# Patient Record
Sex: FEMALE | Race: WHITE | URBAN - METROPOLITAN AREA
[De-identification: names, ages, dates, MRNs, and addresses within clinical notes are randomized per-mention and may not be internally consistent; named-entity substitution may affect disease eponyms.]

---

## 2017-01-01 ENCOUNTER — TELEPHONE (OUTPATIENT)
Dept: PULMONOLOGY | Facility: CLINIC | Age: 67
End: 2017-01-01

## 2017-01-01 ENCOUNTER — TELEPHONE (OUTPATIENT)
Dept: TRANSPLANT | Facility: CLINIC | Age: 67
End: 2017-01-01

## 2017-01-01 ENCOUNTER — TRANSFERRED RECORDS (OUTPATIENT)
Dept: HEALTH INFORMATION MANAGEMENT | Facility: CLINIC | Age: 67
End: 2017-01-01

## 2017-01-01 ENCOUNTER — RESULTS ONLY (OUTPATIENT)
Dept: OTHER | Facility: CLINIC | Age: 67
End: 2017-01-01

## 2017-01-01 ENCOUNTER — OFFICE VISIT (OUTPATIENT)
Dept: PULMONOLOGY | Facility: CLINIC | Age: 67
End: 2017-01-01
Attending: INTERNAL MEDICINE
Payer: MEDICARE

## 2017-01-01 ENCOUNTER — HOSPITAL ENCOUNTER (OUTPATIENT)
Dept: CARDIOLOGY | Facility: CLINIC | Age: 67
Discharge: HOME | End: 2017-05-15
Attending: INTERNAL MEDICINE | Admitting: INTERNAL MEDICINE
Payer: MEDICARE

## 2017-01-01 VITALS
DIASTOLIC BLOOD PRESSURE: 85 MMHG | BODY MASS INDEX: 32.43 KG/M2 | HEART RATE: 68 BPM | RESPIRATION RATE: 18 BRPM | TEMPERATURE: 97.3 F | WEIGHT: 186 LBS | OXYGEN SATURATION: 94 % | SYSTOLIC BLOOD PRESSURE: 138 MMHG

## 2017-01-01 VITALS
HEART RATE: 78 BPM | DIASTOLIC BLOOD PRESSURE: 84 MMHG | HEIGHT: 64 IN | WEIGHT: 198.5 LBS | BODY MASS INDEX: 33.89 KG/M2 | OXYGEN SATURATION: 95 % | RESPIRATION RATE: 18 BRPM | TEMPERATURE: 97.9 F | SYSTOLIC BLOOD PRESSURE: 130 MMHG

## 2017-01-01 DIAGNOSIS — E03.9 HYPOTHYROIDISM, UNSPECIFIED TYPE: ICD-10-CM

## 2017-01-01 DIAGNOSIS — Z94.2 LUNG TRANSPLANT STATUS, BILATERAL (H): ICD-10-CM

## 2017-01-01 DIAGNOSIS — Z94.2 LUNG REPLACED BY TRANSPLANT (H): ICD-10-CM

## 2017-01-01 DIAGNOSIS — N18.30 ANEMIA IN STAGE 3 CHRONIC KIDNEY DISEASE (H): ICD-10-CM

## 2017-01-01 DIAGNOSIS — Z94.2 LUNG REPLACED BY TRANSPLANT (H): Primary | ICD-10-CM

## 2017-01-01 DIAGNOSIS — Z79.899 ENCOUNTER FOR LONG-TERM (CURRENT) USE OF MEDICATIONS: ICD-10-CM

## 2017-01-01 DIAGNOSIS — G47.00 INSOMNIA: ICD-10-CM

## 2017-01-01 DIAGNOSIS — R06.02 SOB (SHORTNESS OF BREATH): ICD-10-CM

## 2017-01-01 DIAGNOSIS — Z79.899 ENCOUNTER FOR LONG-TERM (CURRENT) USE OF HIGH-RISK MEDICATION: ICD-10-CM

## 2017-01-01 DIAGNOSIS — N18.30 CKD (CHRONIC KIDNEY DISEASE) STAGE 3, GFR 30-59 ML/MIN (H): ICD-10-CM

## 2017-01-01 DIAGNOSIS — Z29.9 PROPHYLACTIC MEASURE: ICD-10-CM

## 2017-01-01 DIAGNOSIS — Z94.2 LUNG TRANSPLANT STATUS, BILATERAL (H): Primary | ICD-10-CM

## 2017-01-01 DIAGNOSIS — Z94.2 S/P LUNG TRANSPLANT (H): Primary | ICD-10-CM

## 2017-01-01 DIAGNOSIS — N18.30 CHRONIC KIDNEY DISEASE, STAGE III (MODERATE) (H): ICD-10-CM

## 2017-01-01 DIAGNOSIS — G47.00 INSOMNIA, UNSPECIFIED: Primary | ICD-10-CM

## 2017-01-01 DIAGNOSIS — D63.1 ANEMIA IN STAGE 3 CHRONIC KIDNEY DISEASE (H): ICD-10-CM

## 2017-01-01 DIAGNOSIS — F32.A DEPRESSION: Primary | ICD-10-CM

## 2017-01-01 DIAGNOSIS — Z79.52 LONG TERM CURRENT USE OF SYSTEMIC STEROIDS: ICD-10-CM

## 2017-01-01 DIAGNOSIS — K21.9 GERD (GASTROESOPHAGEAL REFLUX DISEASE): Primary | ICD-10-CM

## 2017-01-01 DIAGNOSIS — R91.8 LUNG MASS: ICD-10-CM

## 2017-01-01 LAB
ANION GAP SERPL CALCULATED.3IONS-SCNC: 11 MMOL/L (ref 3–14)
ANION GAP SERPL CALCULATED.3IONS-SCNC: 12 MMOL/L (ref 3–14)
BASOPHILS # BLD AUTO: 0 10E9/L (ref 0–0.2)
BASOPHILS NFR BLD AUTO: 0 %
BUN SERPL-MCNC: 85 MG/DL (ref 7–30)
BUN SERPL-MCNC: 86 MG/DL (ref 7–30)
CALCIUM SERPL-MCNC: 8.6 MG/DL (ref 8.5–10.1)
CALCIUM SERPL-MCNC: 8.7 MG/DL (ref 8.5–10.1)
CHLORIDE SERPL-SCNC: 104 MMOL/L (ref 94–109)
CHLORIDE SERPL-SCNC: 108 MMOL/L (ref 94–109)
CMV DNA SPEC NAA+PROBE-ACNC: NORMAL [IU]/ML
CMV DNA SPEC NAA+PROBE-ACNC: NORMAL [IU]/ML
CMV DNA SPEC NAA+PROBE-LOG#: NORMAL {LOG_IU}/ML
CMV DNA SPEC NAA+PROBE-LOG#: NORMAL {LOG_IU}/ML
CO2 SERPL-SCNC: 20 MMOL/L (ref 20–32)
CO2 SERPL-SCNC: 27 MMOL/L (ref 20–32)
COPATH REPORT: NORMAL
CREAT SERPL-MCNC: 2.05 MG/DL (ref 0.52–1.04)
CREAT SERPL-MCNC: 2.49 MG/DL (ref 0.52–1.04)
DIFFERENTIAL METHOD BLD: ABNORMAL
DONOR IDENTIFICATION: NORMAL
DSA COMMENTS: NORMAL
DSA PRESENT: NO
DSA TEST METHOD: NORMAL
EOSINOPHIL # BLD AUTO: 0 10E9/L (ref 0–0.7)
EOSINOPHIL NFR BLD AUTO: 0.3 %
ERYTHROCYTE [DISTWIDTH] IN BLOOD BY AUTOMATED COUNT: 13.9 % (ref 10–15)
ERYTHROCYTE [DISTWIDTH] IN BLOOD BY AUTOMATED COUNT: 14.4 % (ref 10–15)
ERYTHROCYTE [DISTWIDTH] IN BLOOD BY AUTOMATED COUNT: 14.9 % (ref 10–15)
EXPTIME-PRE: 5.94 SEC
EXPTIME-PRE: 7.32 SEC
FEF2575-%PRED-PRE: 45 %
FEF2575-%PRED-PRE: 47 %
FEF2575-PRE: 0.92 L/SEC
FEF2575-PRE: 0.96 L/SEC
FEF2575-PRED: 2.01 L/SEC
FEF2575-PRED: 2.02 L/SEC
FEFMAX-%PRED-PRE: 19 %
FEFMAX-%PRED-PRE: 30 %
FEFMAX-PRE: 1.17 L/SEC
FEFMAX-PRE: 1.78 L/SEC
FEFMAX-PRED: 5.87 L/SEC
FEFMAX-PRED: 5.88 L/SEC
FERRITIN SERPL-MCNC: 1255 NG/ML (ref 8–252)
FERRITIN SERPL-MCNC: 523 NG/ML (ref 8–252)
FEV1-%PRED-PRE: 36 %
FEV1-%PRED-PRE: 44 %
FEV1-PRE: 0.84 L
FEV1-PRE: 1.02 L
FEV1FEV6-PRE: 79 %
FEV1FEV6-PRE: 87 %
FEV1FEV6-PRED: 80 %
FEV1FEV6-PRED: 80 %
FEV1FVC-PRE: 77 %
FEV1FVC-PRE: 87 %
FEV1FVC-PRED: 79 %
FEV1FVC-PRED: 79 %
FIFMAX-PRE: 0.41 L/SEC
FIFMAX-PRE: 0.84 L/SEC
FVC-%PRED-PRE: 33 %
FVC-%PRED-PRE: 45 %
FVC-PRE: 0.97 L
FVC-PRE: 1.33 L
FVC-PRED: 2.93 L
FVC-PRED: 2.94 L
GFR SERPL CREATININE-BSD FRML MDRD: 19 ML/MIN/1.7M2
GFR SERPL CREATININE-BSD FRML MDRD: 24 ML/MIN/1.7M2
GLUCOSE SERPL-MCNC: 190 MG/DL (ref 70–99)
GLUCOSE SERPL-MCNC: 93 MG/DL (ref 70–99)
HCT VFR BLD AUTO: 28.5 % (ref 35–47)
HCT VFR BLD AUTO: 28.8 % (ref 35–47)
HCT VFR BLD AUTO: 32.4 % (ref 35–47)
HGB BLD-MCNC: 10.2 G/DL (ref 11.7–15.7)
HGB BLD-MCNC: 9 G/DL (ref 11.7–15.7)
HGB BLD-MCNC: 9.2 G/DL (ref 11.7–15.7)
IMM GRANULOCYTES # BLD: 0.1 10E9/L (ref 0–0.4)
IMM GRANULOCYTES NFR BLD: 1.7 %
INR PPP: 1 (ref 0.86–1.14)
IRON SATN MFR SERPL: 33 % (ref 15–46)
IRON SATN MFR SERPL: 45 % (ref 15–46)
IRON SERPL-MCNC: 124 UG/DL (ref 35–180)
IRON SERPL-MCNC: 82 UG/DL (ref 35–180)
LYMPHOCYTES # BLD AUTO: 0.8 10E9/L (ref 0.8–5.3)
LYMPHOCYTES NFR BLD AUTO: 13.8 %
MAGNESIUM SERPL-MCNC: 2.1 MG/DL (ref 1.6–2.3)
MAGNESIUM SERPL-MCNC: 2.2 MG/DL (ref 1.6–2.3)
MCH RBC QN AUTO: 30.1 PG (ref 26.5–33)
MCH RBC QN AUTO: 31.4 PG (ref 26.5–33)
MCH RBC QN AUTO: 32.4 PG (ref 26.5–33)
MCHC RBC AUTO-ENTMCNC: 31.3 G/DL (ref 31.5–36.5)
MCHC RBC AUTO-ENTMCNC: 31.5 G/DL (ref 31.5–36.5)
MCHC RBC AUTO-ENTMCNC: 32.3 G/DL (ref 31.5–36.5)
MCV RBC AUTO: 103 FL (ref 78–100)
MCV RBC AUTO: 96 FL (ref 78–100)
MCV RBC AUTO: 97 FL (ref 78–100)
MONOCYTES # BLD AUTO: 0.4 10E9/L (ref 0–1.3)
MONOCYTES NFR BLD AUTO: 6.3 %
NEUTROPHILS # BLD AUTO: 4.6 10E9/L (ref 1.6–8.3)
NEUTROPHILS NFR BLD AUTO: 77.9 %
NRBC # BLD AUTO: 0 10*3/UL
NRBC BLD AUTO-RTO: 0 /100
ORGAN: NORMAL
PLATELET # BLD AUTO: 150 10E9/L (ref 150–450)
PLATELET # BLD AUTO: 73 10E9/L (ref 150–450)
PLATELET # BLD AUTO: 73 10E9/L (ref 150–450)
POTASSIUM SERPL-SCNC: 4.3 MMOL/L (ref 3.4–5.3)
POTASSIUM SERPL-SCNC: 4.6 MMOL/L (ref 3.4–5.3)
PRA DONOR SPECIFIC ABY: NORMAL
RBC # BLD AUTO: 2.93 10E12/L (ref 3.8–5.2)
RBC # BLD AUTO: 2.99 10E12/L (ref 3.8–5.2)
RBC # BLD AUTO: 3.15 10E12/L (ref 3.8–5.2)
RETICS # AUTO: 45.9 10E9/L (ref 25–95)
RETICS/RBC NFR AUTO: 1.5 % (ref 0.5–2)
SA1 CELL: NORMAL
SA1 COMMENTS: NORMAL
SA1 HI RISK ABY: NORMAL
SA1 MOD RISK ABY: NORMAL
SA1 TEST METHOD: NORMAL
SA2 CELL: NORMAL
SA2 COMMENTS: NORMAL
SA2 HI RISK ABY UA: NORMAL
SA2 MOD RISK ABY: NORMAL
SA2 TEST METHOD: NORMAL
SIROLIMUS BLD-MCNC: 3 UG/L (ref 5–15)
SIROLIMUS BLD-MCNC: 3.4 UG/L (ref 5–15)
SIROLIMUS BLD-MCNC: ABNORMAL UG/L (ref 5–15)
SODIUM SERPL-SCNC: 140 MMOL/L (ref 133–144)
SODIUM SERPL-SCNC: 142 MMOL/L (ref 133–144)
SPECIMEN SOURCE: NORMAL
SPECIMEN SOURCE: NORMAL
TACROLIMUS BLD-MCNC: 11.9 UG/L (ref 5–15)
TACROLIMUS BLD-MCNC: 5.4 UG/L (ref 5–15)
TACROLIMUS BLD-MCNC: 7.2 UG/L (ref 5–15)
TACROLIMUS BLD-MCNC: 7.2 UG/L (ref 5–15)
TACROLIMUS BLD-MCNC: 7.6 UG/L (ref 5–15)
TACROLIMUS BLD-MCNC: 7.8 UG/L (ref 5–15)
TACROLIMUS BLD-MCNC: 8.4 UG/L (ref 5–15)
TACROLIMUS BLD-MCNC: 8.4 UG/L (ref 5–15)
TACROLIMUS BLD-MCNC: 8.5 UG/L (ref 5–15)
TACROLIMUS BLD-MCNC: 8.7 UG/L (ref 5–15)
TACROLIMUS BLD-MCNC: 9.6 UG/L (ref 5–15)
TIBC SERPL-MCNC: 247 UG/DL (ref 240–430)
TIBC SERPL-MCNC: 277 UG/DL (ref 240–430)
TME LAST DOSE: ABNORMAL H
TME LAST DOSE: NORMAL H
UNOS CPRA: 82
WBC # BLD AUTO: 3.5 10E9/L (ref 4–11)
WBC # BLD AUTO: 5 10E9/L (ref 4–11)
WBC # BLD AUTO: 5.9 10E9/L (ref 4–11)

## 2017-01-01 PROCEDURE — 80197 ASSAY OF TACROLIMUS: CPT | Performed by: INTERNAL MEDICINE

## 2017-01-01 PROCEDURE — 83735 ASSAY OF MAGNESIUM: CPT | Performed by: INTERNAL MEDICINE

## 2017-01-01 PROCEDURE — 80195 ASSAY OF SIROLIMUS: CPT | Performed by: INTERNAL MEDICINE

## 2017-01-01 PROCEDURE — 80048 BASIC METABOLIC PNL TOTAL CA: CPT | Performed by: INTERNAL MEDICINE

## 2017-01-01 PROCEDURE — 83540 ASSAY OF IRON: CPT | Performed by: INTERNAL MEDICINE

## 2017-01-01 PROCEDURE — 85025 COMPLETE CBC W/AUTO DIFF WBC: CPT | Performed by: INTERNAL MEDICINE

## 2017-01-01 PROCEDURE — 85610 PROTHROMBIN TIME: CPT | Performed by: INTERNAL MEDICINE

## 2017-01-01 PROCEDURE — 36415 COLL VENOUS BLD VENIPUNCTURE: CPT | Performed by: INTERNAL MEDICINE

## 2017-01-01 PROCEDURE — 40000611 ZZHCL STATISTIC MORPHOLOGY W/INTERP HEMEPATH TC 85060: Performed by: INTERNAL MEDICINE

## 2017-01-01 PROCEDURE — 99212 OFFICE O/P EST SF 10 MIN: CPT | Mod: ZF

## 2017-01-01 PROCEDURE — 85045 AUTOMATED RETICULOCYTE COUNT: CPT | Performed by: INTERNAL MEDICINE

## 2017-01-01 PROCEDURE — 93306 TTE W/DOPPLER COMPLETE: CPT | Mod: 26 | Performed by: INTERNAL MEDICINE

## 2017-01-01 PROCEDURE — 86832 HLA CLASS I HIGH DEFIN QUAL: CPT | Performed by: INTERNAL MEDICINE

## 2017-01-01 PROCEDURE — 86833 HLA CLASS II HIGH DEFIN QUAL: CPT | Performed by: INTERNAL MEDICINE

## 2017-01-01 PROCEDURE — 83550 IRON BINDING TEST: CPT | Performed by: INTERNAL MEDICINE

## 2017-01-01 PROCEDURE — 99212 OFFICE O/P EST SF 10 MIN: CPT

## 2017-01-01 PROCEDURE — 25500064 ZZH RX 255 OP 636: Performed by: INTERNAL MEDICINE

## 2017-01-01 PROCEDURE — 85027 COMPLETE CBC AUTOMATED: CPT | Performed by: INTERNAL MEDICINE

## 2017-01-01 PROCEDURE — 82728 ASSAY OF FERRITIN: CPT | Performed by: INTERNAL MEDICINE

## 2017-01-01 PROCEDURE — 40000264 ECHO COMPLETE WITH OPTISON

## 2017-01-01 RX ORDER — SIROLIMUS 1 MG
2 TABLET ORAL DAILY
Qty: 60 TABLET | Refills: 11 | Status: SHIPPED | OUTPATIENT
Start: 2017-01-01 | End: 2017-01-01

## 2017-01-01 RX ORDER — SIROLIMUS 1 MG
3 TABLET ORAL DAILY
Qty: 90 TABLET | Refills: 11 | Status: SHIPPED | OUTPATIENT
Start: 2017-01-01 | End: 2017-01-01

## 2017-01-01 RX ORDER — QUETIAPINE FUMARATE 50 MG/1
TABLET, FILM COATED ORAL
Qty: 60 TABLET | Refills: 11 | Status: SHIPPED | OUTPATIENT
Start: 2017-01-01

## 2017-01-01 RX ORDER — SIROLIMUS 1 MG
4 TABLET ORAL DAILY
Qty: 120 TABLET | Refills: 11 | Status: SHIPPED | OUTPATIENT
Start: 2017-01-01

## 2017-01-01 RX ORDER — PANTOPRAZOLE SODIUM 40 MG/1
40 TABLET, DELAYED RELEASE ORAL 2 TIMES DAILY
Qty: 60 TABLET | Refills: 11 | Status: SHIPPED | OUTPATIENT
Start: 2017-01-01

## 2017-01-01 RX ORDER — TACROLIMUS 1 MG/1
CAPSULE ORAL
Qty: 300 CAPSULE | Refills: 11 | Status: SHIPPED | OUTPATIENT
Start: 2017-01-01 | End: 2017-01-01

## 2017-01-01 RX ORDER — MIRTAZAPINE 15 MG/1
TABLET, FILM COATED ORAL
Qty: 30 TABLET | Refills: 11 | Status: SHIPPED | OUTPATIENT
Start: 2017-01-01

## 2017-01-01 RX ORDER — TACROLIMUS 0.5 MG/1
CAPSULE ORAL
Qty: 30 CAPSULE | Refills: 11 | Status: SHIPPED | OUTPATIENT
Start: 2017-01-01 | End: 2017-01-01

## 2017-01-01 RX ORDER — SIROLIMUS 1 MG
1 TABLET ORAL DAILY
Qty: 30 TABLET | Refills: 3 | Status: SHIPPED | OUTPATIENT
Start: 2017-01-01 | End: 2017-01-01

## 2017-01-01 RX ORDER — TACROLIMUS 1 MG/1
5 CAPSULE ORAL 2 TIMES DAILY
Qty: 300 CAPSULE | Refills: 11 | Status: SHIPPED | OUTPATIENT
Start: 2017-01-01

## 2017-01-01 RX ADMIN — HUMAN ALBUMIN MICROSPHERES AND PERFLUTREN 7 ML: 10; .22 INJECTION, SOLUTION INTRAVENOUS at 10:45

## 2017-01-01 ASSESSMENT — PAIN SCALES - GENERAL
PAINLEVEL: NO PAIN (0)
PAINLEVEL: MODERATE PAIN (4)

## 2017-01-17 NOTE — PROGRESS NOTES
Quick Note:    Tacrolimus level 8.7 at 12 hours, on 1/9  Goal 8-10.   No dose changes    Multigig message sent     ______

## 2017-01-17 NOTE — TELEPHONE ENCOUNTER
Called to check in with patient. Patient down to a 4 shiley with trach, she's being seen by local ENT. She states she's breathing more comfortably. She's up walking around without a walker, she's able to walk up/down a flight of stairs. RTC in 2 weeks with Dr. Feliz and will f/u with ENT at this time.

## 2017-01-31 NOTE — PROGRESS NOTES
Quick Note:    Tacrolimus level 9.6 at 12 hours, on 8/23  Goal 8-10.   No dose changes    Discussed with patient       ______

## 2017-02-10 NOTE — PROGRESS NOTES
Quick Note:    Tacrolimus level 8.4 at 12 hours, on 2/6  Goal 8-10.   No dose changes.    Creat up to 2.8 from 2.2. Her local nephrologist increase her lasix back up to 40 mg daily last week from 20 mg. Patient will contact the nephrologist regarding creat rise.       ______

## 2017-02-27 NOTE — PROGRESS NOTES
[facility]  Pulmonary Medicine  February 27, 2017           Today's visit note:     PATIENT PROFILE:  Jackie Romero is a 66-year-old female who is status post bilateral sequential lung transplant in 08/2015, which was complicated by a prolonged hospitalization with a final discharge in 04/2016.  Her post-transplant course was complicated by PEA arrest, which she completely recovered from, CMV reactivation, recurrent pneumonias, recurrent pleural effusions, persistent hypoxemic respiratory failure requiring tracheostomy and complications of diastolic heart failure.  She also developed chronic kidney disease.  She is currently living at home in [place], [place], with her  and follows up with a nephrologist and an ENT physician in [place], [place].  She was last seen in our clinic approximately 4 months ago and comes in today for a followup      INTERVAL HISTORY:  The patient reports that she is doing really well since the last time she was seen in clinic.  She denies any shortness of breath at rest or with dyspnea on mild to moderate exertion.  She does have occasional cough, which is productive of thick tenacious secretions, particularly these clog her trach opening.  She also reports occasional blood clots from her trach, which has been attributed to irritation of the tracheal mucosa.  In addition to that, she denies any wheezing.      REVIEW OF SYSTEMS:     1.  She denies any recent fevers, chills or night sweats.   2.  She reports a good appetite.  She has gained some weight.   3.  She denies any upper airway congestion.   4.  Denies any chest pain or palpitations.   5.  Denies any abdominal discomfort, diarrhea, constipation, nausea, vomiting or GERD.   6.  Denies any musculoskeletal pain except for chronic back pain which is in good control.  She takes oxycodone on an as needed basis for this.   7.  She denies any lower extremity swelling or upper extremity tremors.    8.  She reports her sleep is good.   9.  Reports her mood is good with well controlled depression and anxiety on current medications.      The rest of the review of system is negative except as noted above.      PULMONARY FUNCTION TESTS:  Her FEV1 is 1.02 liters, which is 44% predicted.  FVC is 1.33 liters, which is 45% predicted.  Her FEV1/FVC ratio is 77%.  These tests show a mixed obstructive and restrictive ventilatory defect.  These are stable compared to previous tests 4 months ago; however, the testing technique is limited due to the presence of the tracheostomy and ATS standard for 3 acceptable maneuvers was not met.      IMAGING:  Chest imaging shows bilateral improved effusions, slight improvement in bilateral perihilar mixed opacities.  The etiology of this is unclear, but they have been present for a long time.  Otherwise, no major abnormalities noted.      PHYSICAL EXAMINATION:    GENERAL:  Awake, alert, in no apparent distress, sitting comfortably, talking without any discomfort in full sentences.   EYES:  Anicteric.   HEENT:  Tracheostomy in place.  Moist oral mucosa with a large tongue and prominent peripheral ridging.  Mallampati class II-III upper airway.   PULMONARY:  Normal intensity breath sounds bilaterally with no added sounds.   CARDIOVASCULAR:  Normal S1, S2 with no murmur, rubs or gallops.   ABDOMEN:  Soft, nontender, nondistended, appears obese.   MUSCULOSKELETAL:  No lower extremity edema.  There are no upper extremity tremors.   NEUROLOGIC:  Grossly intact.   PSYCHIATRIC:  Normal affect.      ASSESSMENT AND PLAN:  The patient is a 66-year-old female who is about 18 months status post bilateral sequential lung transplant for chronic obstructive pulmonary disease with a very complicated and prolonged post-transplant course as described above.      1.  Status post bilateral sequential lung transplant for chronic obstructive pulmonary disease.  The patient's symptoms are under good  control.  Her PFTs are stable, although likely not reflective of her lung function given difficulty with technique from tracheostomy.  The patient remains on 3-drug immune suppression including tacrolimus with a goal of 8-10 nanograms per mL, azathioprine and prednisone.  These will be continued.  She has not had any surveillance bronchoscopies due to being acutely ill after her transplant.  Her donor specific antibodies have been negative.  The level from today is pending.  We will follow on the pending results.  No changes to her immunosuppression are being made today.     2.  Persistent hypoxic respiratory failure with need for tracheostomy.  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  The patient will continue to follow with ENT.  She is currently using a size 4 tracheostomy and it has been capped during the day but open at night.  The next step would be to keep it capped at night and consider decannulation in the future.     3.  Chronic kidney disease.  The patient's creatinine today is 2.5, which is stable to slightly improved compared to more recent creatinines.  However, overall this is suggestive of significant chronic kidney disease.  This is likely multifactorial with calcineurin inhibitor toxicity being the prime cause.  She will continue to follow with her local nephrologist.     4.  Leukopenia.  This is chronic and stable.  She will continue to be on low-dose Imuran and Bactrim 3 times a week.     5.  Anemia.  This is chronic, likely related to chronic kidney disease.  She continues to get Aranesp injections per her nephrologist.     6.  Seizure disorder.  The patient has a history of seizure disorder, which are provoked by stress.  She remains on levetiracetam 500 mg twice a day.     7.  Gastroesophageal reflux disease.  She remains on pantoprazole 40 mg twice a day with no remnant symptoms.     8.  Infectious disease.  She remains on Bactrim  for PJP prophylaxis.  We are monitoring her CMV levels according to our protocol.     9.  Diastolic heart failure.  The patient remains on furosemide and metoprolol, which will be continued.     10.  Depression and anxiety.  These appear to be under good control.  She will remain on her chronic mirtazapine, escitalopram and quetiapine.     11.  Health care maintenance.  The patient is recommended to undergo annual transplant labs including a DEXA scan, full PFTs including a 6-minute walk test, as well as a Dermatology followup.  These will be done during her next clinic visit.      The patient will be seen in clinic in approximately 3 months.             PHYSICAL EXAM:  Vitals:    02/27/17 0941   BP: 138/85   BP Location: Right arm   Patient Position: Chair   Cuff Size: Adult Regular   Pulse: 68   Resp: 18   Temp: 97.3  F (36.3  C)   TempSrc: Oral   SpO2: 94%   Weight: 84.4 kg (186 lb)              Data:     I reviewed all resulted lab tests and imaging studies.    Results for orders placed or performed in visit on 02/27/17   General PFT Lab (Please always keep checked)   Result Value Ref Range    FVC-Pred 2.94 L    FVC-Pre 1.33 L    FVC-%Pred-Pre 45 %    FEV1-Pre 1.02 L    FEV1-%Pred-Pre 44 %    FEV1FVC-Pred 79 %    FEV1FVC-Pre 77 %    FEFMax-Pred 5.88 L/sec    FEFMax-Pre 1.78 L/sec    FEFMax-%Pred-Pre 30 %    FEF2575-Pred 2.02 L/sec    FEF2575-Pre 0.92 L/sec    QBK5469-%Pred-Pre 45 %    ExpTime-Pre 7.32 sec    FIFMax-Pre 0.41 L/sec    FEV1FEV6-Pred 80 %    FEV1FEV6-Pre 79 %     *Note: Due to a large number of results and/or encounters for the requested time period, some results have not been displayed. A complete set of results can be found in Results Review.                Past Medical and Surgical History:     Past Medical History   Diagnosis Date     Acute on chronic diastolic congestive heart failure (H) 2/8/2016     Arthritis      CAD (coronary artery disease) 4/18/10     Mild, per cath     Chronic back pain       CKD (chronic kidney disease) stage 4, GFR 15-29 ml/min (H)      Dialysis March 2016     Colitis 2007     History of colitis for which she underwent a colonoscopy approximately 3 years ago and this was essentially normal      COPD (chronic obstructive pulmonary disease) (H)      Very severe lung disease.     Depressive disorder      Hemorrhoids      History of blood transfusion      Hyperlipidemia      Hypertension      Lung transplant status, bilateral (H)      Osteoporosis      Osteoporosis with multiple factors and kyphoplasties.  She had a kyphoplasty on 8/27/09 for L3, T10, and T11 on January 20 to 23 and T12 to L1 in October 2008.  At least 1 or 2 of these episodes seem to have been associated with seizures.      PEA (Pulseless electrical activity) (H) 10/19/2015     PMR (polymyalgia rheumatica) (H)      Pneumonia      Complicating lung transplant     Respiratory failure with hypoxia (H)      Seizure (H)      Has had grand mal seizures since age 16 and not well controlled on dilantin.  She has had an EEG which was normal in May 2009.  Started on Keppra in May 2010.     Vitamin D deficiency      Past Surgical History   Procedure Laterality Date     Hysterectomy total abdominal, bilateral salpingo-oophorectomy, combined  1976     For Cyst on ovaries and endometriosis.     Appendectomy       Carpal tunnel release rt/lt       Carpal tunnel surgery for pain in her right arm in August 2009.  She is still having some numbness in the tips of her thumb, index finger, and possibly the middle finger.      Cataract iol, rt/lt       Destr, macular drusen, photocoagulation       Kyphoplasty       T12, L1     Kyphoplasty  1/21/2009     T10-11     Heart cath fyi  3/11/2009     Vasospasm, no significan disease; again at [place] 4/18/10     Incision and closure of sternum N/A 8/31/2015     Procedure: INCISION AND CLOSURE OF STERNUM;  Surgeon: Eusebio Cotton MD;  Location: [facility]     Picc insertion Right 9/8/2015     5fr DL Power  PICC, 41cm (1cm external) in the R basilic vein     Tracheostomy percutaneous N/A 9/10/2015     Procedure: TRACHEOSTOMY PERCUTANEOUS;  Surgeon: Zeb Tiwari MD;  Location: [facility]     Transplant lung recipient single x2 Bilateral 8/27/2015     Procedure: TRANSPLANT LUNG RECIPIENT SINGLE X2;  Surgeon: Eusebio Cotton MD;  Location: [facility]     Bronchoscopy flexible N/A 11/17/2015     Procedure: BRONCHOSCOPY FLEXIBLE;  Surgeon: Lex Bustillo MD;  [facility]: [facility]     Bronchoscopy flexible N/A 12/2/2015     Procedure: BRONCHOSCOPY FLEXIBLE;  Surgeon: Zeb Tiwari MD;  [facility]: [facility]     Bronchoscopy flexible N/A 12/11/2015     Procedure: BRONCHOSCOPY FLEXIBLE;  Surgeon: Vivek Feliz MD;  Location: [facility]     Laser ktp laryngoscopy N/A 5/27/2016     Procedure: LASER KTP LARYNGOSCOPY;  Surgeon: Dane Alvarado MD;  [facility]: [facility]     Bronchoscopy flexible N/A 5/27/2016     Procedure: BRONCHOSCOPY FLEXIBLE;  Surgeon: Dane Alvarado MD;  [facility]           Family History:     Family History   Problem Relation Age of Onset     Emphysema Father      COD, smoker     CEREBROVASCULAR DISEASE Mother      Suicide Son      18 yo     Substance Abuse Brother      COD     Substance Abuse Brother      DIABETES Mother      Hypertension Sister      OSTEOPOROSIS Mother      Breast Cancer Mother      LUNG DISEASE Father      Substance Abuse Brother      Hypertension Sister             Social History:     Social History     Social History     Marital status:      Spouse name: N/A     Number of children: N/A     Years of education: N/A     Occupational History     Not on file.     Social History Main Topics     Smoking status: Former Smoker     Packs/day: 1.50     Years: 40.00     Types: Cigarettes     Start date: 1/1/1968     Quit date: 10/12/2008     Smokeless tobacco: Former User      Comment: Quit in 2008. started in 1968. Smoked 1 to 2 packs per day.     Alcohol use  No     Drug use: No     Sexual activity: Not Currently     Partners: Male     Other Topics Concern     Not on file     Social History Narrative    No longer drinks alcohol. 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  Did not have any significant exposures during any of her jobs except for the fact that she had a coal furnace at home when she was growing up.        1 daughter: healthy    7 siblings:  2 :  1 bleeding ulcer. 1  of drug over dose.             Medications:     Current Outpatient Prescriptions   Medication     mirtazapine (REMERON) 15 MG tablet     sodium chloride, PF, 0.9% PF flush     tacrolimus (PROGRAF - GENERIC EQUIVALENT) 1 MG capsule     darbepoetin maycol (ARANESP, ALBUMIN FREE,) 60 MCG/0.3ML injection     metoprolol (LOPRESSOR) 25 MG tablet     Dextromethorphan-Guaifenesin (ROBITUSSIN DM PO)     azaTHIOprine (IMURAN) 50 MG tablet     levETIRAcetam (KEPPRA) 250 MG tablet     pantoprazole (PROTONIX) 40 MG enteric coated tablet     oxyCODONE-acetaminophen (PERCOCET) 5-325 MG per tablet     sulfamethoxazole-trimethoprim (BACTRIM) 400-80 MG per tablet     furosemide (LASIX) 20 MG tablet     Calcium Carb-Cholecalciferol (CALCIUM 500 + D) 500-400 MG-UNIT TABS     predniSONE (DELTASONE) 2.5 MG tablet     escitalopram (LEXAPRO) 10 MG tablet     QUEtiapine (SEROQUEL) 100 MG tablet     predniSONE (DELTASONE) 5 MG tablet     NITROGLYCERIN SL     order for DME     senna-docusate (SENOKOT-S;PERICOLACE) 8.6-50 MG per tablet     MELATONIN PO     ondansetron (ZOFRAN) 4 MG tablet     multivitamin, therapeutic (THERA-VIT) TABS     ORDER FOR DME     ORDER FOR DME     No current facility-administered medications for this visit.      Vivek Feliz MD   of Medicine  Pulmonary, Critical Care and Sleep Medicine  [facility]  Pager: [phone]        Answers for HPI/ROS  submitted by the patient on 2/27/2017   General Symptoms: No  Skin Symptoms: No  HENT Symptoms: No  EYE SYMPTOMS: No  HEART SYMPTOMS: No  LUNG SYMPTOMS: No  INTESTINAL SYMPTOMS: No  URINARY SYMPTOMS: No  GYNECOLOGIC SYMPTOMS: No  BREAST SYMPTOMS: No  SKELETAL SYMPTOMS: No  BLOOD SYMPTOMS: No  NERVOUS SYSTEM SYMPTOMS: No  MENTAL HEALTH SYMPTOMS: No

## 2017-02-27 NOTE — LETTER
2/27/2017       RE: Jackie Romero  [address] [place]  [place] [address]     Dear Colleague,    Thank you for referring your patient, Jackie Romero, to the [facility] at [facility]. Please see a copy of my visit note below.    [facility]  Pulmonary Medicine  February 27, 2017           Today's visit note:      Dictation on: 02/27/2017 11:29 AM by: MARY ANN ADAMS [[email or web address]]           PHYSICAL EXAM:  Vitals:    02/27/17 0941   BP: 138/85   BP Location: Right arm   Patient Position: Chair   Cuff Size: Adult Regular   Pulse: 68   Resp: 18   Temp: 97.3  F (36.3  C)   TempSrc: Oral   SpO2: 94%   Weight: 84.4 kg (186 lb)              Data:     I reviewed all resulted lab tests and imaging studies.    Results for orders placed or performed in visit on 02/27/17   General PFT Lab (Please always keep checked)   Result Value Ref Range    FVC-Pred 2.94 L    FVC-Pre 1.33 L    FVC-%Pred-Pre 45 %    FEV1-Pre 1.02 L    FEV1-%Pred-Pre 44 %    FEV1FVC-Pred 79 %    FEV1FVC-Pre 77 %    FEFMax-Pred 5.88 L/sec    FEFMax-Pre 1.78 L/sec    FEFMax-%Pred-Pre 30 %    FEF2575-Pred 2.02 L/sec    FEF2575-Pre 0.92 L/sec    NWI0797-%Pred-Pre 45 %    ExpTime-Pre 7.32 sec    FIFMax-Pre 0.41 L/sec    FEV1FEV6-Pred 80 %    FEV1FEV6-Pre 79 %     *Note: Due to a large number of results and/or encounters for the requested time period, some results have not been displayed. A complete set of results can be found in Results Review.                Past Medical and Surgical History:     Past Medical History   Diagnosis Date     Acute on chronic diastolic congestive heart failure (H) 2/8/2016     Arthritis      CAD (coronary artery disease) 4/18/10     Mild, per cath     Chronic back pain      CKD (chronic kidney disease) stage 4, GFR 15-29 ml/min (H)      Dialysis March 2016     Colitis 2007     History of colitis for which she underwent a colonoscopy  approximately 3 years ago and this was essentially normal      COPD (chronic obstructive pulmonary disease) (H)      Very severe lung disease.     Depressive disorder      Hemorrhoids      History of blood transfusion      Hyperlipidemia      Hypertension      Lung transplant status, bilateral (H)      Osteoporosis      Osteoporosis with multiple factors and kyphoplasties.  She had a kyphoplasty on 8/27/09 for L3, T10, and T11 on January 20 to 23 and T12 to L1 in October 2008.  At least 1 or 2 of these episodes seem to have been associated with seizures.      PEA (Pulseless electrical activity) (H) 10/19/2015     PMR (polymyalgia rheumatica) (H)      Pneumonia      Complicating lung transplant     Respiratory failure with hypoxia (H)      Seizure (H)      Has had grand mal seizures since age 16 and not well controlled on dilantin.  She has had an EEG which was normal in May 2009.  Started on Keppra in May 2010.     Vitamin D deficiency      Past Surgical History   Procedure Laterality Date     Hysterectomy total abdominal, bilateral salpingo-oophorectomy, combined  1976     For Cyst on ovaries and endometriosis.     Appendectomy       Carpal tunnel release rt/lt       Carpal tunnel surgery for pain in her right arm in August 2009.  She is still having some numbness in the tips of her thumb, index finger, and possibly the middle finger.      Cataract iol, rt/lt       Destr, macular drusen, photocoagulation       Kyphoplasty       T12, L1     Kyphoplasty  1/21/2009     T10-11     Heart cath fyi  3/11/2009     Vasospasm, no significan disease; again at [place] 4/18/10     Incision and closure of sternum N/A 8/31/2015     Procedure: INCISION AND CLOSURE OF STERNUM;  Surgeon: Eusebio Cotton MD;  [facility]     Picc insertion Right 9/8/2015     5fr DL Power PICC, 41cm (1cm external) in the R basilic vein     Tracheostomy percutaneous N/A 9/10/2015     Procedure: TRACHEOSTOMY PERCUTANEOUS;  Surgeon: Zeb Tiwari  MD Eze;  Location: [facility]     Transplant lung recipient single x2 Bilateral 8/27/2015     Procedure: TRANSPLANT LUNG RECIPIENT SINGLE X2;  Surgeon: Eusebio Cotton MD;  [facility]     Bronchoscopy flexible N/A 11/17/2015     Procedure: BRONCHOSCOPY FLEXIBLE;  Surgeon: Lex Bustillo MD;  [facility]     Bronchoscopy flexible N/A 12/2/2015     Procedure: BRONCHOSCOPY FLEXIBLE;  Surgeon: Zeb Tiwari MD;  [facility]     Bronchoscopy flexible N/A 12/11/2015     Procedure: BRONCHOSCOPY FLEXIBLE;  Surgeon: Vivek Feliz MD;  [facility]     Laser ktp laryngoscopy N/A 5/27/2016     Procedure: LASER KTP LARYNGOSCOPY;  Surgeon: Dane Alvarado MD;  [facility]     Bronchoscopy flexible N/A 5/27/2016     Procedure: BRONCHOSCOPY FLEXIBLE;  Surgeon: Dane Alvarado MD;  [facility]           Family History:     Family History   Problem Relation Age of Onset     Emphysema Father      COD, smoker     CEREBROVASCULAR DISEASE Mother      Suicide Son      18 yo     Substance Abuse Brother      COD     Substance Abuse Brother      DIABETES Mother      Hypertension Sister      OSTEOPOROSIS Mother      Breast Cancer Mother      LUNG DISEASE Father      Substance Abuse Brother      Hypertension Sister             Social History:     Social History     Social History     Marital status:      Spouse name: N/A     Number of children: N/A     Years of education: N/A     Occupational History     Not on file.     Social History Main Topics     Smoking status: Former Smoker     Packs/day: 1.50     Years: 40.00     Types: Cigarettes     Start date: 1/1/1968     Quit date: 10/12/2008     Smokeless tobacco: Former User      Comment: Quit in 2008. started in 1968. Smoked 1 to 2 packs per day.     Alcohol use No     Drug use: No     Sexual activity: Not Currently     Partners: Male     Other Topics Concern     Not on file     Social History Narrative    No longer  drinks alcohol. 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  Did not have any significant exposures during any of her jobs except for the fact that she had a coal furnace at home when she was growing up.        1 daughter: healthy    7 siblings:  2 :  1 bleeding ulcer. 1  of drug over dose.             Medications:     Current Outpatient Prescriptions   Medication     mirtazapine (REMERON) 15 MG tablet     sodium chloride, PF, 0.9% PF flush     tacrolimus (PROGRAF - GENERIC EQUIVALENT) 1 MG capsule     darbepoetin maycol (ARANESP, ALBUMIN FREE,) 60 MCG/0.3ML injection     metoprolol (LOPRESSOR) 25 MG tablet     Dextromethorphan-Guaifenesin (ROBITUSSIN DM PO)     azaTHIOprine (IMURAN) 50 MG tablet     levETIRAcetam (KEPPRA) 250 MG tablet     pantoprazole (PROTONIX) 40 MG enteric coated tablet     oxyCODONE-acetaminophen (PERCOCET) 5-325 MG per tablet     sulfamethoxazole-trimethoprim (BACTRIM) 400-80 MG per tablet     furosemide (LASIX) 20 MG tablet     Calcium Carb-Cholecalciferol (CALCIUM 500 + D) 500-400 MG-UNIT TABS     predniSONE (DELTASONE) 2.5 MG tablet     escitalopram (LEXAPRO) 10 MG tablet     QUEtiapine (SEROQUEL) 100 MG tablet     predniSONE (DELTASONE) 5 MG tablet     NITROGLYCERIN SL     order for DME     senna-docusate (SENOKOT-S;PERICOLACE) 8.6-50 MG per tablet     MELATONIN PO     ondansetron (ZOFRAN) 4 MG tablet     multivitamin, therapeutic (THERA-VIT) TABS     ORDER FOR DME     ORDER FOR DME     No current facility-administered medications for this visit.      Vivek Feliz MD   of Medicine  Pulmonary, Critical Care and Sleep Medicine  [facility]  Pager: [phone]        Answers for HPI/ROS submitted by the patient on 2017   General Symptoms: No  Skin Symptoms: No  HENT Symptoms: No  EYE SYMPTOMS: No  HEART SYMPTOMS: No  LUNG SYMPTOMS:  No  INTESTINAL SYMPTOMS: No  URINARY SYMPTOMS: No  GYNECOLOGIC SYMPTOMS: No  BREAST SYMPTOMS: No  SKELETAL SYMPTOMS: No  BLOOD SYMPTOMS: No  NERVOUS SYSTEM SYMPTOMS: No  MENTAL HEALTH SYMPTOMS: No      Again, thank you for allowing me to participate in the care of your patient.      Sincerely,    Vivek Feliz MD

## 2017-02-27 NOTE — LETTER
2/27/2017      RE: Jackie Romero  [address] [place] [address]       [facility]  Pulmonary Medicine  February 27, 2017           Today's visit note:      Dictation on: 02/27/2017 11:29 AM by: MARY ANN ADAMS [[email or web address]]           PHYSICAL EXAM:  Vitals:    02/27/17 0941   BP: 138/85   BP Location: Right arm   Patient Position: Chair   Cuff Size: Adult Regular   Pulse: 68   Resp: 18   Temp: 97.3  F (36.3  C)   TempSrc: Oral   SpO2: 94%   Weight: 84.4 kg (186 lb)              Data:     I reviewed all resulted lab tests and imaging studies.    Results for orders placed or performed in visit on 02/27/17   General PFT Lab (Please always keep checked)   Result Value Ref Range    FVC-Pred 2.94 L    FVC-Pre 1.33 L    FVC-%Pred-Pre 45 %    FEV1-Pre 1.02 L    FEV1-%Pred-Pre 44 %    FEV1FVC-Pred 79 %    FEV1FVC-Pre 77 %    FEFMax-Pred 5.88 L/sec    FEFMax-Pre 1.78 L/sec    FEFMax-%Pred-Pre 30 %    FEF2575-Pred 2.02 L/sec    FEF2575-Pre 0.92 L/sec    ZPN1092-%Pred-Pre 45 %    ExpTime-Pre 7.32 sec    FIFMax-Pre 0.41 L/sec    FEV1FEV6-Pred 80 %    FEV1FEV6-Pre 79 %     *Note: Due to a large number of results and/or encounters for the requested time period, some results have not been displayed. A complete set of results can be found in Results Review.                Past Medical and Surgical History:     Past Medical History   Diagnosis Date     Acute on chronic diastolic congestive heart failure (H) 2/8/2016     Arthritis      CAD (coronary artery disease) 4/18/10     Mild, per cath     Chronic back pain      CKD (chronic kidney disease) stage 4, GFR 15-29 ml/min (H)      Dialysis March 2016     Colitis 2007     History of colitis for which she underwent a colonoscopy approximately 3 years ago and this was essentially normal      COPD (chronic obstructive pulmonary disease) (H)      Very severe lung disease.     Depressive disorder      Hemorrhoids      History of blood transfusion       Hyperlipidemia      Hypertension      Lung transplant status, bilateral (H)      Osteoporosis      Osteoporosis with multiple factors and kyphoplasties.  She had a kyphoplasty on 8/27/09 for L3, T10, and T11 on January 20 to 23 and T12 to L1 in October 2008.  At least 1 or 2 of these episodes seem to have been associated with seizures.      PEA (Pulseless electrical activity) (H) 10/19/2015     PMR (polymyalgia rheumatica) (H)      Pneumonia      Complicating lung transplant     Respiratory failure with hypoxia (H)      Seizure (H)      Has had grand mal seizures since age 16 and not well controlled on dilantin.  She has had an EEG which was normal in May 2009.  Started on Keppra in May 2010.     Vitamin D deficiency      Past Surgical History   Procedure Laterality Date     Hysterectomy total abdominal, bilateral salpingo-oophorectomy, combined  1976     For Cyst on ovaries and endometriosis.     Appendectomy       Carpal tunnel release rt/lt       Carpal tunnel surgery for pain in her right arm in August 2009.  She is still having some numbness in the tips of her thumb, index finger, and possibly the middle finger.      Cataract iol, rt/lt       Destr, macular drusen, photocoagulation       Kyphoplasty       T12, L1     Kyphoplasty  1/21/2009     T10-11     Heart cath fyi  3/11/2009     Vasospasm, no significan disease; again at [place] 4/18/10     Incision and closure of sternum N/A 8/31/2015     Procedure: INCISION AND CLOSURE OF STERNUM;  Surgeon: Eusebio Cotton MD;  Location: [facility] OR     Picc insertion Right 9/8/2015     5fr DL Power PICC, 41cm (1cm external) in the R basilic vein     Tracheostomy percutaneous N/A 9/10/2015     Procedure: TRACHEOSTOMY PERCUTANEOUS;  Surgeon: Zeb Tiwari MD;  Location: [facility] OR     Transplant lung recipient single x2 Bilateral 8/27/2015     Procedure: TRANSPLANT LUNG RECIPIENT SINGLE X2;  Surgeon: Eusebio Cotton MD;  Location: [facility] OR     Bronchoscopy flexible N/A  11/17/2015     Procedure: BRONCHOSCOPY FLEXIBLE;  Surgeon: Lex Bustillo MD;  Location:  GI     Bronchoscopy flexible N/A 12/2/2015     Procedure: BRONCHOSCOPY FLEXIBLE;  Surgeon: Zeb Tiwari MD;  Location:  GI     Bronchoscopy flexible N/A 12/11/2015     Procedure: BRONCHOSCOPY FLEXIBLE;  Surgeon: Vivek Feliz MD;  Location:  GI     Laser ktp laryngoscopy N/A 5/27/2016     Procedure: LASER KTP LARYNGOSCOPY;  Surgeon: Dane Alvarado MD;  Location:  OR     Bronchoscopy flexible N/A 5/27/2016     Procedure: BRONCHOSCOPY FLEXIBLE;  Surgeon: Dane Alvarado MD;  Location:  OR           Family History:     Family History   Problem Relation Age of Onset     Emphysema Father      COD, smoker     CEREBROVASCULAR DISEASE Mother      Suicide Son      20 yo     Substance Abuse Brother      COD     Substance Abuse Brother      DIABETES Mother      Hypertension Sister      OSTEOPOROSIS Mother      Breast Cancer Mother      LUNG DISEASE Father      Substance Abuse Brother      Hypertension Sister             Social History:     Social History     Social History     Marital status:      Spouse name: N/A     Number of children: N/A     Years of education: N/A     Occupational History     Not on file.     Social History Main Topics     Smoking status: Former Smoker     Packs/day: 1.50     Years: 40.00     Types: Cigarettes     Start date: 1/1/1968     Quit date: 10/12/2008     Smokeless tobacco: Former User      Comment: Quit in 2008. started in 1968. Smoked 1 to 2 packs per day.     Alcohol use No     Drug use: No     Sexual activity: Not Currently     Partners: Male     Other Topics Concern     Not on file     Social History Narrative    No longer drinks alcohol. 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  Did not have any significant exposures during any  of her jobs except for the fact that she had a coal furnace at home when she was growing up.        1 daughter: healthy    7 siblings:  2 :  1 bleeding ulcer. 1  of drug over dose.             Medications:     Current Outpatient Prescriptions   Medication     mirtazapine (REMERON) 15 MG tablet     sodium chloride, PF, 0.9% PF flush     tacrolimus (PROGRAF - GENERIC EQUIVALENT) 1 MG capsule     darbepoetin maycol (ARANESP, ALBUMIN FREE,) 60 MCG/0.3ML injection     metoprolol (LOPRESSOR) 25 MG tablet     Dextromethorphan-Guaifenesin (ROBITUSSIN DM PO)     azaTHIOprine (IMURAN) 50 MG tablet     levETIRAcetam (KEPPRA) 250 MG tablet     pantoprazole (PROTONIX) 40 MG enteric coated tablet     oxyCODONE-acetaminophen (PERCOCET) 5-325 MG per tablet     sulfamethoxazole-trimethoprim (BACTRIM) 400-80 MG per tablet     furosemide (LASIX) 20 MG tablet     Calcium Carb-Cholecalciferol (CALCIUM 500 + D) 500-400 MG-UNIT TABS     predniSONE (DELTASONE) 2.5 MG tablet     escitalopram (LEXAPRO) 10 MG tablet     QUEtiapine (SEROQUEL) 100 MG tablet     predniSONE (DELTASONE) 5 MG tablet     NITROGLYCERIN SL     order for DME     senna-docusate (SENOKOT-S;PERICOLACE) 8.6-50 MG per tablet     MELATONIN PO     ondansetron (ZOFRAN) 4 MG tablet     multivitamin, therapeutic (THERA-VIT) TABS     ORDER FOR DME     ORDER FOR DME     No current facility-administered medications for this visit.      Vivek Feliz MD   of Medicine  Pulmonary, Critical Care and Sleep Medicine  [facility]  Pager: [phone]        Answers for HPI/ROS submitted by the patient on 2017   General Symptoms: No  Skin Symptoms: No  HENT Symptoms: No  EYE SYMPTOMS: No  HEART SYMPTOMS: No  LUNG SYMPTOMS: No  INTESTINAL SYMPTOMS: No  URINARY SYMPTOMS: No  GYNECOLOGIC SYMPTOMS: No  BREAST SYMPTOMS: No  SKELETAL SYMPTOMS: No  BLOOD SYMPTOMS: No  NERVOUS SYSTEM SYMPTOMS: No  MENTAL HEALTH SYMPTOMS: No      PATIENT PROFILE:   Jackie Romero is a 66-year-old female who is status post bilateral sequential lung transplant in 08/2015, which was complicated by a prolonged hospitalization with a final discharge in 04/2016.  Her post-transplant course was complicated by PEA arrest, which she completely recovered from, CMV reactivation, recurrent pneumonias, recurrent pleural effusions, persistent hypoxemic respiratory failure requiring tracheostomy and complications of diastolic heart failure.  She also developed chronic kidney disease.  She is currently living at home in [place], [place], with her  and follows up with a nephrologist and an ENT physician in [place], [place].  She was last seen in our clinic approximately 4 months ago and comes in today for a followup      INTERVAL HISTORY:  The patient reports that she is doing really well since the last time she was seen in clinic.  She denies any shortness of breath at rest or with dyspnea on mild to moderate exertion.  She does have occasional cough, which is productive of thick tenacious secretions, particularly these clog her trach opening.  She also reports occasional blood clots from her trach, which has been attributed to irritation of the tracheal mucosa.  In addition to that, she denies any wheezing.      REVIEW OF SYSTEMS:     1.  She denies any recent fevers, chills or night sweats.   2.  She reports a good appetite.  She has gained some weight.   3.  She denies any upper airway congestion.   4.  Denies any chest pain or palpitations.   5.  Denies any abdominal discomfort, diarrhea, constipation, nausea, vomiting or GERD.   6.  Denies any musculoskeletal pain except for chronic back pain which is in good control.  She takes oxycodone on an as needed basis for this.   7.  She denies any lower extremity swelling or upper extremity tremors.   8.  She reports her sleep is good.   9.  Reports her mood is good with well controlled depression and anxiety on current  medications.      The rest of the review of system is negative except as noted above.      PULMONARY FUNCTION TESTS:  Her FEV1 is 1.02 liters, which is 44% predicted.  FVC is 1.33 liters, which is 45% predicted.  Her FEV1/FVC ratio is 77%.  These tests show a mixed obstructive and restrictive ventilatory defect.  These are stable compared to previous tests 4 months ago; however, the testing technique is limited due to the presence of the tracheostomy and ATS standard for 3 acceptable maneuvers was not met.      IMAGING:  Chest imaging shows bilateral improved effusions, slight improvement in bilateral perihilar mixed opacities.  The etiology of this is unclear, but they have been present for a long time.  Otherwise, no major abnormalities noted.      PHYSICAL EXAMINATION:    GENERAL:  Awake, alert, in no apparent distress, sitting comfortably, talking without any discomfort in full sentences.   EYES:  Anicteric.   HEENT:  Tracheostomy in place.  Moist oral mucosa with a large tongue and prominent peripheral ridging.  Mallampati class II-III upper airway.   PULMONARY:  Normal intensity breath sounds bilaterally with no added sounds.   CARDIOVASCULAR:  Normal S1, S2 with no murmur, rubs or gallops.   ABDOMEN:  Soft, nontender, nondistended, appears obese.   MUSCULOSKELETAL:  No lower extremity edema.  There are no upper extremity tremors.   NEUROLOGIC:  Grossly intact.   PSYCHIATRIC:  Normal affect.      ASSESSMENT AND PLAN:  The patient is a 66-year-old female who is about 18 months status post bilateral sequential lung transplant for chronic obstructive pulmonary disease with a very complicated and prolonged post-transplant course as described above.      1.  Status post bilateral sequential lung transplant for chronic obstructive pulmonary disease.  The patient's symptoms are under good control.  Her PFTs are stable, although likely not reflective of her lung function given difficulty with technique from  tracheostomy.  The patient remains on 3-drug immune suppression including tacrolimus with a goal of 8-10 nanograms per mL, azathioprine and prednisone.  These will be continued.  She has not had any surveillance bronchoscopies due to being acutely ill after her transplant.  Her donor specific antibodies have been negative.  The level from today is pending.  We will follow on the pending results.  No changes to her immunosuppression are being made today.   2.  Persistent hypoxic respiratory failure with need for tracheostomy.  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  The patient will continue to follow with ENT.  She is currently using a size 4 tracheostomy and it has been capped during the day but open at night.  The next step would be to keep it capped at night and consider decannulation in the future.   3.  Chronic kidney disease.  The patient's creatinine today is 2.5, which is stable to slightly improved compared to more recent creatinines.  However, overall this is suggestive of significant chronic kidney disease.  This is likely multifactorial with calcineurin inhibitor toxicity being the prime cause.  She will continue to follow with her local nephrologist.   4.  Leukopenia.  This is chronic and stable.  She will continue to be on low-dose Imuran and Bactrim 3 times a week.   5.  Anemia.  This is chronic, likely related to chronic kidney disease.  She continues to get Aranesp injections per her nephrologist.   6.  Seizure disorder.  The patient has a history of seizure disorder, which are provoked by stress.  She remains on levetiracetam 500 mg twice a day.   7.  Gastroesophageal reflux disease.  She remains on pantoprazole 40 mg twice a day with no remnant symptoms.   8.  Infectious disease.  She remains on Bactrim for PJP prophylaxis.  We are monitoring her CMV levels according to our protocol.   9.  Diastolic heart failure.  The patient remains  on furosemide and metoprolol, which will be continued.   10.  Depression and anxiety.  These appear to be under good control.  She will remain on her chronic mirtazapine, escitalopram and quetiapine.   11.  Health care maintenance.  The patient is recommended to undergo annual transplant labs including a DEXA scan, full PFTs including a 6-minute walk test, as well as a Dermatology followup.  These will be done during her next clinic visit.      The patient will be seen in clinic in approximately 3 months.      Vivek Feliz MD

## 2017-02-27 NOTE — PATIENT INSTRUCTIONS
Patient Instructions  1. No med changes  2. See local dermatologist for an annual check up    Next transplant clinic appointment: 3 months  Next lab draw: monthly on a transplant standpoint     ~~~~~~~~~~~~~~~~~~~~~~~~~    Thoracic Transplant Office phone [phone], fax [phone]  Office Hours 8:30 - 5:00     For after-hours urgent issues, please dial [phone], and ask to speak with the Thoracic Transplant Coordinator  On-Call, pager [phone].  --------------------  To expedite your medication refill(s), please contact your pharmacy and have them fax a refill request to: [phone].   *Please allow 3 business days for routine medication refills.  *Please allow 5 business days for controlled substance medication refills.    **For Diabetic medications and supplies refill(s), please contact your pharmacy and have them  Contact your Endocrine team.  --------------------  For scheduling appointments call [facility] :  [phone]. For lab appointments call [phone] or [facility].  --------------------  Please Note: If you are active on [employer], all future test results will be sent by [employer] message only, and will no longer be called to patient. You may also receive communication directly from your physician.

## 2017-02-27 NOTE — NURSING NOTE
Chief Complaint   Patient presents with     Medication Follow-up     Patient is here to follow up with medications     Naa Montemayor CMA 9:41 AM on 2/27/2017.

## 2017-02-27 NOTE — NURSING NOTE
Transplant Coordinator Note    Reason for visit: Post lung transplant follow up visit   Coordinator: Present   Caregiver: spouse/daughter     Health concerns addressed today:  1.Denies SOB with rest or with daily activities.   2. Trach- downsized to a 4 Shiley . Followed by local ENT, Dr. Maurice   3. Chronic back pain  4. Elevated creat- followed by local nephrologist       Labs, CXR, PFTs reviewed with patient  Medication record reviewed and reconciled  Questions and concerns addressed    Patient Instructions  1. No med changes  2. See local dermatologist for an annual check up    Next transplant clinic appointment: 3 months  Next lab draw: monthly on a transplant standpoint       AVS printed at time of check out

## 2017-02-27 NOTE — MR AVS SNAPSHOT
After Visit Summary   2/27/2017    Jackie Romero    MRN: [id number]           Patient Information     Date Of Birth          1950        Visit Information        Provider Department      2/27/2017 10:00 AM Vivek Feliz MD [facility]        Today's Diagnoses     Lung replaced by transplant (H)    -  1    Encounter for long-term (current) use of high-risk medication        Chronic kidney disease, stage III (moderate)         Long term current use of systemic steroids           Care Instructions    Patient Instructions  1. No med changes  2. See local dermatologist for an annual check up    Next transplant clinic appointment: 3 months  Next lab draw: monthly on a transplant standpoint     ~~~~~~~~~~~~~~~~~~~~~~~~~    Thoracic Transplant Office phone [phone], fax [phone]  Office Hours 8:30 - 5:00     For after-hours urgent issues, please dial [phone], and ask to speak with the Thoracic Transplant Coordinator  On-Call, pager [phone].  --------------------  To expedite your medication refill(s), please contact your pharmacy and have them fax a refill request to: [phone].   *Please allow 3 business days for routine medication refills.  *Please allow 5 business days for controlled substance medication refills.    **For Diabetic medications and supplies refill(s), please contact your pharmacy and have them  Contact your Endocrine team.  --------------------  For scheduling appointments call [facility] transplant :  [phone]. For lab appointments call [phone] or [facility].  --------------------  Please Note: If you are active on Audiam, all future test results will be sent by Audiam message only, and will no longer be called to patient. You may also receive communication directly from your physician.        Follow-ups after your visit        Follow-up notes from your care team     Return in about 3 months (around 5/27/2017).      Your  next 10 appointments already scheduled     Jun 05, 2017  8:45 AM CDT   Lab with [facility] LAB   [facility] ([facility])    [address] [address]   [phone]            Jun 05, 2017  9:00 AM CDT   DX HIP/PELVIS/SPINE with UCDX1   [facility] Dexa ([facility])    [address] [place] [id number] [phone]           Please do not take any of the following 48 hours prior to your exam: vitamins, calcium tablets, antacids.            Jun 05, 2017  9:35 AM CDT   (Arrive by 9:20 AM)   XR CHEST 2 VIEWS with XR1   [facility] Xray ([facility])    [address] [id number] [phone]           Please bring a list of your current medicines to your exam. (Include vitamins, minerals and over-thecounter medicines.) Leave your valuables at home.  Tell your doctor if there is a chance you may be pregnant.  You do not need to do anything special for this exam.            Jun 05, 2017 10:00 AM CDT   FULL PULMONARY FUNCTION with  PFL A   [facility] Pulmonary Function Testing ([facility])    [address] [address]   [phone]            Jun 05, 2017 11:00 AM CDT   Six Minute Walk with UC PFL 6 MINUTE WALK 1   [facility] Pulmonary Function Testing ([facility])    [address] [address]   [phone]            Jun 05, 2017 11:20 AM CDT   (Arrive by 11:05 AM)   Return Lung Transplant with Vivek Feliz MD   [facility] ([facility])    [address] [place] [id number]   [phone]              Future tests that were ordered for you today     Open Future Orders        Priority Expected Expires Ordered    Magnesium Routine 6/12/2017  8/27/2017 2/27/2017    Phosphorus Routine 6/12/2017 8/27/2017 2/27/2017    Comprehensive metabolic panel Routine 6/12/2017 8/27/2017 2/27/2017    CBC with platelets differential Routine 6/12/2017 8/27/2017 2/27/2017    Hemoglobin A1c Routine 6/12/2017 8/27/2017 2/27/2017    Lipid Profile Routine 6/12/2017 8/27/2017 2/27/2017    Vitamin D Deficiency Routine 6/12/2017 8/27/2017 2/27/2017    INR Routine 6/12/2017 8/27/2017 2/27/2017    Testosterone total Routine 6/12/2017 8/27/2017 2/27/2017    6 minute walk test Routine 6/12/2017 8/27/2017 2/27/2017    VITAL CAPACITY TOTAL Routine 6/12/2017 8/27/2017 2/27/2017    Spirometry, Breathing Capacity Routine 6/12/2017 8/27/2017 2/27/2017    Diffusing Capacity Routine 6/12/2017 8/27/2017 2/27/2017    PRA Donor Specific Antibody Routine 6/12/2017 8/27/2017 2/27/2017    X-ray Chest 2 vws* Routine 6/12/2017 8/27/2017 2/27/2017    Dexa hip/pelvis/spine* Routine 6/12/2017 8/27/2017 2/27/2017    Tacrolimus level Routine 6/12/2017 8/27/2017 2/27/2017            Who to contact     If you have questions or need follow up information about today's clinic visit or your schedule please contact [facility] directly at [phone].  Normal or non-critical lab and imaging results will be communicated to you by WegoWisehart, letter or phone within 4 business days after the clinic has received the results. If you do not hear from us within 7 days, please contact the clinic through Moons[employer] or phone. If you have a critical or abnormal lab result, we will notify you by phone as soon as possible.  Submit refill requests through [employer] or call your pharmacy and they will forward the refill request to us. Please allow 3 business days for your refill to be completed.          Additional Information About Your Visit        Wego[employer]isehart Information     [employer] gives you secure access to your electronic health record. If you see a primary care provider, you can also send  messages to your care team and make appointments. If you have questions, please call your primary care clinic.  If you do not have a primary care provider, please call [phone] and they will assist you.        Care EveryWhere ID     This is your Care EveryWhere ID. This could be used by other organizations to access your [place] medical records  [id number]        Your Vitals Were     Pulse Temperature Respirations Pulse Oximetry Breastfeeding? BMI (Body Mass Index)    68 97.3  F (36.3  C) (Oral) 18 94% No 32.43 kg/m2       Blood Pressure from Last 3 Encounters:   02/27/17 138/85   10/31/16 143/82   08/12/16 141/69    Weight from Last 3 Encounters:   02/27/17 84.4 kg (186 lb)   10/31/16 78.9 kg (174 lb)   08/12/16 78.9 kg (173 lb 14.4 oz)               Primary Care Provider Office Phone # Fax #    Sandra Diane Mock MD, [phone]       [address] [address]        Thank you!     Thank you for choosing [facility]  for your care. Our goal is always to provide you with excellent care. Hearing back from our patients is one way we can continue to improve our services. Please take a few minutes to complete the written survey that you may receive in the mail after your visit with us. Thank you!             Your Updated Medication List - Protect others around you: Learn how to safely use, store and throw away your medicines at www.disposemymeds.org.          This list is accurate as of: 2/27/17 10:55 AM.  Always use your most recent med list.                   Brand Name Dispense Instructions for use    ARANESP (ALBUMIN FREE) 60 MCG/0.3ML injection   Generic drug:  darbepoetin maycol      Inject 60 mcg Subcutaneous once a week       azaTHIOprine 50 MG tablet    IMURAN     Take 50 mg by mouth daily       Calcium Carb-Cholecalciferol 500-400 MG-UNIT Tabs    calcium 500 + D    30 tablet    Take 1 tablet by mouth daily        escitalopram 10 MG tablet    LEXAPRO    60 tablet    Take 2 tablets (20 mg) by mouth daily       LASIX 20 MG tablet   Generic drug:  furosemide     180 tablet    Take 80 mg by mouth daily Take in the morning after recording your morning weight       levETIRAcetam 250 MG tablet    KEPPRA    60 tablet    Take 1 tablet (250 mg) by mouth every 12 hours       MELATONIN PO      Take 3 mg by mouth nightly as needed       metoprolol 25 MG tablet    LOPRESSOR     Take 25 mg by mouth 2 times daily       mirtazapine 15 MG tablet    REMERON    30 tablet    TAKE 1 TABLET BY MOUTH DAILY AT BEDTIME       multivitamin, therapeutic Tabs tablet     30 tablet    Take 1 tablet by mouth daily       NITROGLYCERIN SL          ondansetron 4 MG tablet    ZOFRAN    18 tablet    Take 1 tablet (4 mg) by mouth every 6 hours as needed for nausea or vomiting       * order for DME      Oxygen-3 liters per minute.       * order for DME     1 Units    Equipment being ordered: BIPAP. Goal settings of 10/5 with 3lpm NC O2 bleed through.       order for DME     1 Device    Equipment being ordered from [facility]: Shiley cuffless trach size 6 with inner cannula *Please send extra inner cannula       oxyCODONE-acetaminophen 5-325 MG per tablet    PERCOCET    120 tablet    Take 1-2 tablets by mouth every 6 hours as needed for moderate to severe pain (maximum 8/day)       pantoprazole 40 MG EC tablet    PROTONIX    30 tablet    Take 1 tablet (40 mg) by mouth 2 times daily       * predniSONE 2.5 MG tablet    DELTASONE    30 tablet    Take 1 tablet (2.5 mg) by mouth every evening       * predniSONE 5 MG tablet    DELTASONE    30 tablet    Take 1 tablet (5 mg) by mouth every morning       QUEtiapine 100 MG tablet    SEROquel    30 tablet    Take 1 tablet (100 mg) by mouth At Bedtime       ROBITUSSIN DM PO      Take 10 mLs by mouth every 6 hours       senna-docusate 8.6-50 MG per tablet    SENOKOT-S;PERICOLACE    120 tablet    Take 1-2 tablets by mouth 2  times daily as needed for constipation       sodium chloride (PF) 0.9% PF flush     300 mL    May use 3cc in trach cares as needed PRN       sulfamethoxazole-trimethoprim 400-80 MG per tablet    BACTRIM    12 tablet    Take 1 tablet by mouth three times a week (Mon, Wed, Fri)       tacrolimus 1 MG capsule    PROGRAF - GENERIC EQUIVALENT    360 capsule    Take 6 capsules (6 mg) by mouth 2 times daily       * Notice:  This list has 4 medication(s) that are the same as other medications prescribed for you. Read the directions carefully, and ask your doctor or other care provider to review them with you.

## 2017-03-10 NOTE — PROGRESS NOTES
Tacrolimus level 8.5 at 12 hours, on 3/6  Goal 8-10.   No dose changes      Left patient VM. Instructed her to call her nephrologist regarding rising creat.   SMART message sent

## 2017-04-03 NOTE — TELEPHONE ENCOUNTER
D/t increasing creatinine, local nephrologist called asking if her tacro could be switched to Rapamune. Discussed with Dr. Feliz and plan is to start sirolimus 1 mg daily with a target goal of 5-7 and decrease tacrolimus goal to 5-7. FK on 3/20 was 7.2, no dose changes at this time. Patient will get a set of labs including rapa/FK levels on Monday 4/3.

## 2017-04-04 NOTE — TELEPHONE ENCOUNTER
Urgent Please  Prior Authorization Retail Medication Request  Medication/Dose: Sirolimus 1mg  Diagnosis and ICD code: Z94.2 Lung Transplant  New/Renewal/Insurance Change PA: New  Previously Tried and Failed Therapies: Putting Jackie on Sirolimus from Tacrolimus to help preserve kidney function    COVER MY MEDS [id number]    Insurance ID (if provided):   Insurance Phone (if provided):     Any additional info from fax request:     If you received a fax notification from an outside Pharmacy:  [facility]  Pharmacy #:[phone]  Pharmacy Fax:[phone]

## 2017-04-05 NOTE — TELEPHONE ENCOUNTER
Prior Authorization Approval    Authorization Effective Date: 4/5/2017  Authorization Expiration Date: 12/31/2099  Medication: Sirolimus 1mg  Approved Dose/Quantity: 30  Reference #: [id number]   Insurance Company:    Expected CoPay: $65.43     CoPay Card Available: No   Foundation Assistance Needed:    Which Pharmacy is filling the prescription (Not needed for infusion/clinic administered): [facility] - [place] [address]  Pharmacy Notified: Yes  Patient Notified: Yes    [facility] Prior Authorization Team   Phone: [phone]  Fax: [phone]

## 2017-04-05 NOTE — TELEPHONE ENCOUNTER
PA Initiation    Medication: Sirolimus 1mg  Insurance Company:    Pharmacy Filling the Rx: [facility] - [place], [place] - [facility] [address]  Filling Pharmacy Phone: [phone]  Filling Pharmacy Fax: [phone]  Start Date: 4/5/2017    [facility] Authorization Team   Phone: [phone]  Fax: [phone]

## 2017-04-18 NOTE — TELEPHONE ENCOUNTER
Tacrolimus level 11.9 at 12 hours, on 4/12  Goal 5-7.   Current dose 6 mg in AM, 6 mg in PM    Dose changed to  5.5 mg in AM, 5 mg in PM   Recheck level in 7 days    Rapa level <2 @ 24 hours, goal 5-7.   Current dose 1 mg daily  Dose changed to 2 mg daily     Discussed with kit

## 2017-04-28 NOTE — TELEPHONE ENCOUNTER
Called [facility], and explained both Tacrolimus and Sirolimus  Levels need to be marked on the mailers paperwork so tests won't me missed.  will diana in chart.

## 2017-04-28 NOTE — TELEPHONE ENCOUNTER
Rapa 3 @ 24 hours, goal 5-7.  Current dose 2 mg daily  Dose changed to 3 mg daily.     Prograf level 5.4 @ 12 hours, goal 5-7. No dose changes.     Repeat levels in 7-10 days.

## 2017-05-12 NOTE — TELEPHONE ENCOUNTER
Patient recently discharged from [facility] for SOB, chest pain. Spoke with patient who states her o2 sats are in the mid 80s with activity and increase to >90% with rest. Plan is to see patient on Monday in clinic with Dr. Feliz with chest CT w/o contrast, labs, PFTs.     Discharge notes states patient was discharged on sirolimus 1 mg in the AM 2 mg in the PM. Spouse thought the dose was changed to BID back on 4/28 so patient has been taking BID sirolimus for a few weeks. Educated patient that sirolimus should be taken qd and to take all 3 mg in the AM. Tacro/rapa levels were not checked during recent hospitalization.

## 2017-05-15 NOTE — NURSING NOTE
Chief Complaint   Patient presents with     Lung Transplant     Follow up on Jackie and her Lung TX     Donnie Renner CMA at 8:05 AM on 5/15/2017

## 2017-05-15 NOTE — NURSING NOTE
Transplant Coordinator Note    Reason for visit: Post lung transplant follow up visit   Coordinator: Present   Caregiver: present - daughter and spouse    Health concerns addressed today:  1. SOB - CT of chest completed, SOB for 2 weeks (chest tightness)  2. On bothTacrolimus and Sirolimus  3. Recently discharged from [facility]  4. Trach with speaking valve  5. Taking Omnicef antibiotic  6. Fatigued, echo to evaluate diastolic dysfunction  7. HAs with coughing  8. Still working on weaning from trach  9. Weight up at 198- weight loss      Activity: SOB with activity, (improved in last few days)  Oxygen needs: Room air, PFTs, decreased  Diabetic management: NA  Pt Education: reviewed goal of weight loss and exercise  Health Maintenance:      Labs, CXR, PFTs reviewed with patient  Medication record reviewed and reconciled  Questions and concerns addressed    Patient Instructions  1. Set up return visit to nephrology  2. Stay well hydrated with 65 oz of fluids daily  3. Exercise - goal of -30 minutes of non stop walking daiy  4. Echo of heart today  5. If CMV is normal- will not need to see ID locally in [facility]  6. Set up appt to see nutritionist to set up therapy plan of weight loss  7. Repeat CBC later this week for low platelets  8. Redraw CBC with Diff and peripheral smear  See Cardiologist back home following echo    Next transplant clinic appointment:  Already have appt for June 5th or 12th with CXR, labs and PFTs  Next lab draw: today      AVS printed at time of check out

## 2017-05-15 NOTE — LETTER
5/15/2017       RE: Jackie Romero  [address] [facility] [place] [address]     Dear Colleague,    Thank you for referring your patient, Jackie Romero, to the [facility] at [facility]. Please see a copy of my visit note below.    [facility]  Pulmonary Medicine  May 15, 2017           Today's visit note:     PATIENT PROFILE:  Jackie Romero is a 66-year-old female who is status post bilateral sequential lung transplant in 08/2015, which was complicated by a prolonged hospitalization with a final discharge from the hospital in 04/2016.  Her post-transplant course was complicated by PEA arrest from which she completely recovered, CMV reactivation, recurrent pneumonias, recurrent pleural effusions, persistent hypoxemic respiratory failure requiring tracheostomy, CKD and complications of diastolic heart failure.  She is currently living at home in [place], [place], with her  and follows up with a nephrologist and an ENT physician in [place], [place].  She was last seen in our clinic 3 months ago.      INTERVAL HISTORY:  Since her last visit she was in a local hospital in [place] for 1 week where she was admitted with chest pain in setting of hypertension and had some worsening hypoxia. She underwent evaluation of coronary artery disease which was negative. Her symptoms improved with control of blood pressure. Since getting out of the hospital she has noted that she is continuing to require some supplemental O2 through her nasal canula. She also reports some cough which is productive of clear mucous, denies any resting dyspnea but reports dyspnea on mild to moderate exertion. She also reports some headache. She denies any recent fever, chills, night sweats, upper airway congestion, palpitations or dizziness, abdominal discomfort. She has pedal edema. Rest of the review of systems is negative.     "  PULMONARY FUNCTION TESTS:  Her FEV1 is 0.84 liters, which is 36% predicted.  FVC is 0.97 liters, which is 33% predicted.  Her FEV1/FVC ratio is 87%.  These tests show a severe mixed obstructive and restrictive ventilatory defect.  There is a decline compared to last tests; however, the testing technique is limited due to the presence of the tracheostomy and ATS standard for 3 acceptable maneuvers was not met.      IMAGING:  CXR with bilateral hilar opacities, stable compared to last test. CT chest with bilateral opacities, likely atelectasis. No infiltrates or lung mass.     PHYSICAL EXAMINATION:    /84  Pulse 78  Temp 97.9  F (36.6  C) (Oral)  Resp 18  Ht 1.613 m (5' 3.5\")  Wt 90 kg (198 lb 8 oz)  SpO2 95%  BMI 34.61 kg/m2  GENERAL:  Awake, alert, in no apparent distress, sitting comfortably, talking without any discomfort in full sentences.   EYES:  Anicteric. No conjunctival icterus.   HEENT:  Tracheostomy in place.  Moist oral mucosa with a large tongue and prominent peripheral ridging.  Mallampati class II-III upper airway.   PULMONARY:  Normal intensity breath sounds bilaterally with no added sounds.   CARDIOVASCULAR:  Normal S1, S2 with no murmur, rubs or gallops. Regular rate and rhythm.   ABDOMEN:  Soft, nontender, nondistended, appears obese.   MUSCULOSKELETAL:  No lower extremity edema.  There are no upper extremity tremors.   NEUROLOGIC:  Grossly intact.   PSYCHIATRIC:  Normal affect.      ASSESSMENT AND PLAN:  The patient is a 66-year-old female who is about 18 months status post bilateral sequential lung transplant for chronic obstructive pulmonary disease with a very complicated and prolonged post-transplant course as described above.     1.  Status post bilateral sequential lung transplant for chronic obstructive pulmonary disease. The patient currently has tracheostomy and was slowly progressing towards decannulation until recently when she had hospitalization in [place].  Although " it does not appear that she has graft failure, she does have significant decrease in her PFTs which are likely reflective of poor effort and overall poor health.  The most likely cause of her recent presentation was hypertensive urgency leading to decompensation of diastolic heart failure.  This was also precipitated by chronic kidney disease.  We are not making any immunosuppression changes today.  He will remain on tacrolimus 4-6 ng/mL and sirolimus 4-6 ng/mL.  She also remains on MMF and prednisone.  Upon review of her chest CT scan bilateral scattered atelectasis were noted with no significant signs of infections.  She will continue to work with local ENT and speech therapy to progress towards trach decannulation.      2.  Chronic kidney disease.  The patient's creatinine today is 2.0 which is improved compared to recent levels.  The most likely etiology of her chronic kidney disease is related to calcineurin inhibitor toxicity.  She will continue to follow with her local nephrologist for this.     3.  Obesity.  The patient is noted to have gained significant amount of weight approximately 25 pounds in last 6 months.  She does have some pedal edema due to chronic kidney disease however this has not significantly changed from the past.  It is very likely that her weight gain is contributing to poor control of diastolic dysfunction and to worsening of her lung function.  We will make a referral to local nutritionist to work on a weight loss plan.  She will also continue to discuss further strategies with her primary care provider.      4.  Diastolic heart failure and hypertension. She had a recent episode of hypertensive urgency which resulted in decompensation of diastolic heart failure.  She is currently on metoprolol which will be continued.  Her blood pressure is currently controlled.  We will make a referral to a cardiologist for further evaluation and management.  We will get a transthoracic echo today.       5.  Thrombocytopenia.  This is noted today and the labs.  She does not have a history of low platelets.  The etiology of this is unclear and is likely related to lab error we will repeat CBC today.      6.  Seizure disorder.  She remains levetiracetam 500 mg twice a day and denies any recent episodes of seizures.      7.  Anemia.  This is likely chronic due to kidney disease.  She continues to get erythropoietin injections per her nephrologist.  We will continue to monitor.      8.  GERD.  Symptoms are well controlled on current PPI therapy.  This will be continued.      The patient will be seen back in clinic in 1 month.           Data:     I reviewed all resulted lab tests and imaging studies.    Results for orders placed or performed in visit on 05/15/17   CT Chest w/o contrast    Narrative    EXAMINATION: Chest CT  5/15/2017     CLINICAL HISTORY: Follow-up lung mass seen during outside facility  bronchoscopy, lung transplant.    COMPARISON: CT 6/13/2016.    TECHNIQUE: CT imaging obtained through the chest without intravenous  contrast. Coronal, sagittal and axial MIP reformatted images obtained.    FINDINGS:  Postoperative changes of bilateral lung transplantation. A few tiny  discrete pulmonary nodules are identified. For example, there is a 2  mm nodule within the right upper lobe (series 4, image 70). Again  noted is scattered bilateral atelectasis, somewhat improved since the  prior CT dated 6/13/2016. Chronic subtle bilateral pleural effusions,  left greater than right. No pneumothorax. Tracheostomy tube in place.  Scattered areas of mucus plugging, particularly within the left lower  lobe. No endobronchial masses or lesions identified.    Cardiomegaly. No pericardial effusion. Normal branching pattern of the  aorta. Unremarkable thyroid. Noncontrast evaluation of the central  pulmonary vasculature is unremarkable. Moderate atherosclerotic  calcifications of the coronary vasculature. No significant  mediastinal  or axillary lymphadenopathy.    Limited evaluation of the superior abdomen is unremarkable. Healed  bilateral rib fractures. Vertebroplasty changes within the lower  thoracic spine. Multiple compression deformities identified.      Impression    IMPRESSION:   1. Postoperative changes of bilateral lung transplantation. No  bronchial or lung mass identified.  2. Scattered bilateral atelectasis, slightly improved since 6/13/2016.  3. Subtle bilateral chronic pleural effusions, left greater than  right.  4. Stable thoracic spine compression deformities.    I have personally reviewed the examination and initial interpretation  and I agree with the findings.    BRODIE PHAN MD     *Note: Due to a large number of results and/or encounters for the requested time period, some results have not been displayed. A complete set of results can be found in Results Review.                Past Medical and Surgical History:     Past Medical History:   Diagnosis Date     Acute on chronic diastolic congestive heart failure (H) 2/8/2016     Arthritis      CAD (coronary artery disease) 4/18/10    Mild, per cath     Chronic back pain      CKD (chronic kidney disease) stage 4, GFR 15-29 ml/min (H)     Dialysis March 2016     Colitis 2007    History of colitis for which she underwent a colonoscopy approximately 3 years ago and this was essentially normal      COPD (chronic obstructive pulmonary disease) (H)     Very severe lung disease.     Depressive disorder      Hemorrhoids      History of blood transfusion      Hyperlipidemia      Hypertension      Lung transplant status, bilateral (H)      Osteoporosis     Osteoporosis with multiple factors and kyphoplasties.  She had a kyphoplasty on 8/27/09 for L3, T10, and T11 on January 20 to 23 and T12 to L1 in October 2008.  At least 1 or 2 of these episodes seem to have been associated with seizures.      PEA (Pulseless electrical activity) (H) 10/19/2015     PMR (polymyalgia  rheumatica) (H)      Pneumonia     Complicating lung transplant     Respiratory failure with hypoxia (H)      Seizure (H)     Has had grand mal seizures since age 16 and not well controlled on dilantin.  She has had an EEG which was normal in May 2009.  Started on Keppra in May 2010.     Vitamin D deficiency      Past Surgical History:   Procedure Laterality Date     APPENDECTOMY       BRONCHOSCOPY FLEXIBLE N/A 11/17/2015    Procedure: BRONCHOSCOPY FLEXIBLE;  Surgeon: Lex Bustillo MD;  Location: [facility]     BRONCHOSCOPY FLEXIBLE N/A 12/2/2015    Procedure: BRONCHOSCOPY FLEXIBLE;  Surgeon: Zeb Tiwari MD;  Location: [facility]     BRONCHOSCOPY FLEXIBLE N/A 12/11/2015    Procedure: BRONCHOSCOPY FLEXIBLE;  Surgeon: Vivek Feliz MD;  [facility]     BRONCHOSCOPY FLEXIBLE N/A 5/27/2016    Procedure: BRONCHOSCOPY FLEXIBLE;  Surgeon: Dane Alvarado MD;  Location: [facility]     CARPAL TUNNEL RELEASE RT/LT      Carpal tunnel surgery for pain in her right arm in August 2009.  She is still having some numbness in the tips of her thumb, index finger, and possibly the middle finger.      CATARACT IOL, RT/LT       DESTR, MACULAR DRUSEN, PHOTOCOAGULATION       HEART CATH FYI  3/11/2009    Vasospasm, no significan disease; again at [place] 4/18/10     HYSTERECTOMY TOTAL ABDOMINAL, BILATERAL SALPINGO-OOPHORECTOMY, COMBINED  1976    For Cyst on ovaries and endometriosis.     INCISION AND CLOSURE OF STERNUM N/A 8/31/2015    Procedure: INCISION AND CLOSURE OF STERNUM;  Surgeon: Eusebio Cotton MD;  Location: [facility]     KYPHOPLASTY      T12, L1     KYPHOPLASTY  1/21/2009    T10-11     LASER KTP LARYNGOSCOPY N/A 5/27/2016    Procedure: LASER KTP LARYNGOSCOPY;  Surgeon: Dane Alvarado MD;  Location: [facility]     PICC INSERTION Right 9/8/2015    5fr DL Power PICC, 41cm (1cm external) in the R basilic vein     TRACHEOSTOMY PERCUTANEOUS N/A 9/10/2015    Procedure: TRACHEOSTOMY PERCUTANEOUS;  Surgeon:  Zeb Tiwari MD;  Location: [facility]     TRANSPLANT LUNG RECIPIENT SINGLE X2 Bilateral 2015    Procedure: TRANSPLANT LUNG RECIPIENT SINGLE X2;  Surgeon: Eusebio Cotton MD;  Location: [facility]           Family History:     Family History   Problem Relation Age of Onset     Emphysema Father      COD, smoker     LUNG DISEASE Father      CEREBROVASCULAR DISEASE Mother      DIABETES Mother      OSTEOPOROSIS Mother      Breast Cancer Mother      Suicide Son      18 yo     Substance Abuse Brother      COD     Substance Abuse Brother      Hypertension Sister             Social History:     Social History     Social History     Marital status:      Spouse name: N/A     Number of children: N/A     Years of education: N/A     Occupational History     Not on file.     Social History Main Topics     Smoking status: Former Smoker     Packs/day: 1.50     Years: 40.00     Types: Cigarettes     Start date: 1968     Quit date: 10/12/2008     Smokeless tobacco: Former User      Comment: Quit in . started in . Smoked 1 to 2 packs per day.     Alcohol use No     Drug use: No     Sexual activity: Not Currently     Partners: Male     Other Topics Concern     Not on file     Social History Narrative    No longer drinks alcohol. 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  Did not have any significant exposures during any of her jobs except for the fact that she had a coal furnace at home when she was growing up.        1 daughter: healthy    7 siblings:  2 :  1 bleeding ulcer. 1  of drug over dose.             Medications:     Current Outpatient Prescriptions   Medication     QUEtiapine (SEROQUEL) 50 MG tablet     pantoprazole (PROTONIX) 40 MG EC tablet     mirtazapine (REMERON) 15 MG tablet     sodium chloride, PF, 0.9% PF flush     darbepoetin maycol (ARANESP, ALBUMIN FREE,) 60 MCG/0.3ML  injection     metoprolol (LOPRESSOR) 25 MG tablet     Dextromethorphan-Guaifenesin (ROBITUSSIN DM PO)     azaTHIOprine (IMURAN) 50 MG tablet     levETIRAcetam (KEPPRA) 250 MG tablet     oxyCODONE-acetaminophen (PERCOCET) 5-325 MG per tablet     sulfamethoxazole-trimethoprim (BACTRIM) 400-80 MG per tablet     furosemide (LASIX) 20 MG tablet     Calcium Carb-Cholecalciferol (CALCIUM 500 + D) 500-400 MG-UNIT TABS     predniSONE (DELTASONE) 2.5 MG tablet     escitalopram (LEXAPRO) 10 MG tablet     QUEtiapine (SEROQUEL) 100 MG tablet     predniSONE (DELTASONE) 5 MG tablet     NITROGLYCERIN SL     order for DME     senna-docusate (SENOKOT-S;PERICOLACE) 8.6-50 MG per tablet     MELATONIN PO     ondansetron (ZOFRAN) 4 MG tablet     multivitamin, therapeutic (THERA-VIT) TABS     ORDER FOR DME     ORDER FOR DME     tacrolimus (PROGRAF - GENERIC EQUIVALENT) 1 MG capsule     RAPAMUNE 1 MG PO TABLET     No current facility-administered medications for this visit.      Vivek Feliz MD   of Medicine  Pulmonary, Critical Care and Sleep Medicine  [facility]  Pager: [phone]

## 2017-05-15 NOTE — MR AVS SNAPSHOT
After Visit Summary   5/15/2017    Jackie Romero    MRN: [id number]           Patient Information     Date Of Birth          1950        Visit Information        Provider Department      5/15/2017 8:00 AM Vivek Feliz MD [facility]        Today's Diagnoses     Lung replaced by transplant (H)    -  1    Encounter for long-term (current) use of medications        SOB (shortness of breath)          Care Instructions    Patient Instructions  1. Set up return visit to nephrology  2. Stay well hydrated with 65 oz of fluids daily  3. Exercise - goal of -30 minutes of non stop walking daiy  4. Echo of heart today  5. If CMV is normal- will not need to see ID locally in [place]  6. Set up appt to see nutritionist to set up therapy plan of weight loss  7. Repeat CBC later this week for low platelets  8. Redraw CBC with Diff and peripheral smear  See Cardiologist back home following echo    Next transplant clinic appointment:  Already have appt for June 5th or 12th with CXR, labs and PFTs  Next lab draw: today      AVS printed at time of check out    ~~~~~~~~~~~~~~~~~~~~~~~~~    Thoracic Transplant Office phone [phone], fax [phone]  Office Hours 8:30 - 5:00     For after-hours urgent issues, please dial [phone], and ask to speak with the Thoracic Transplant Coordinator  On-Call, pager [phone].  --------------------  To expedite your medication refill(s), please contact your pharmacy and have them fax a refill request to: [phone].   *Please allow 3 business days for routine medication refills.  *Please allow 5 business days for controlled substance medication refills.    **For Diabetic medications and supplies refill(s), please contact your pharmacy and have them  Contact your Endocrine team.  --------------------  For scheduling appointments call [facility] :  [phone]. For lab appointments call [phone] or  [facility].  --------------------  Please Note: If you are active on [employer], all future test results will be sent by [employer] message only, and will no longer be called to patient. You may also receive communication directly from your physician.        Follow-ups after your visit        Your next 10 appointments already scheduled     May 15, 2017  9:30 AM CDT   Ech Complete with UUECHR2   [facility], [place],  [facility] ([facility], [facility])    [address] [facility] [id number] [phone]           1.  Please bring or wear a comfortable two-piece outfit. 2.  You may eat, drink and take your normal medicines. 3.  For any questions that cannot be answered, please contact the ordering physician            Jun 12, 2017  6:45 AM CDT   Lab with  LAB    [facility] ([facility])    [address] [place] [id number] [phone]            Jun 12, 2017  7:00 AM CDT   FULL PULMONARY FUNCTION with  PFL A   [facility] Pulmonary Function Testing ([facility])    [address] [id number] [phone]            Jun 12, 2017  8:30 AM CDT   DX HIP/PELVIS/SPINE with DX[address] [place] [address] Dexa ([facility])    [address] [id number] [phone]           Please do not take any of the following 48 hours prior to your exam: vitamins, calcium tablets, antacids.            Jun 12, 2017  9:00 AM CDT   (Arrive by 8:45 AM)   XR CHEST 2 VIEWS with XR[address] [place] [address] Xray ([facility])    [address] [id number] [phone]           Please bring a list of your current medicines to your exam. (Include vitamins, minerals and over-thecounter medicines.) Leave your valuables at home.  Tell your doctor if there is a chance you may be pregnant.  You  do not need to do anything special for this exam.            Jun 12, 2017  9:20 AM CDT   (Arrive by 9:05 AM)   Return Lung Transplant with Vivek Feliz MD   [facility] ([facility])    [address] [place] [address]  [address] [facility] [id number] [phone]            Jun 12, 2017 10:00 AM CDT   Six Minute Walk with UC PFL 6 MINUTE WALK 1   [facility] Pulmonary Function Testing ([facility])    [address] [id number] [phone]              Future tests that were ordered for you today     Open Future Orders        Priority Expected Expires Ordered    Echocardiogram Complete Routine 5/15/2017 5/16/2017 5/15/2017    PRA Donor Specific Antibody Routine 6/12/2017 6/30/2017 5/15/2017    Sirolimus level Routine 6/12/2017 6/30/2017 5/15/2017    Basic metabolic panel Routine 6/12/2017 6/30/2017 5/15/2017    Magnesium Routine 6/12/2017 6/30/2017 5/15/2017    CBC with platelets Routine 6/12/2017 6/30/2017 5/15/2017    CMV DNA quantification Routine 6/12/2017 6/30/2017 5/15/2017    X-ray Chest 2 vws* Routine 6/12/2017 6/30/2017 5/15/2017    Spirometry, Breathing Capacity Routine 6/12/2017 6/30/2017 5/15/2017    Tacrolimus level Routine 6/12/2017 6/30/2017 5/15/2017    Bld morphology pathology review Routine 5/15/2017 5/16/2017 5/15/2017    CBC with platelets differential Routine 5/15/2017 5/16/2017 5/15/2017            Who to contact     If you have questions or need follow up information about today's clinic visit or your schedule please contact [facility] directly at [phone].  Normal or non-critical lab and imaging results will be communicated to you by MyChart, letter or phone within 4 business days after the clinic has received the results. If you do not hear from us within 7 days, please contact the clinic through MyChart or phone. If you have a critical or  "abnormal lab result, we will notify you by phone as soon as possible.  Submit refill requests through [employer] or call your pharmacy and they will forward the refill request to us. Please allow 3 business days for your refill to be completed.          Additional Information About Your Visit        [employer] Information     [employer] gives you secure access to your electronic health record. If you see a primary care provider, you can also send messages to your care team and make appointments. If you have questions, please call your primary care clinic.  If you do not have a primary care provider, please call [phone] and they will assist you.        Care EveryWhere ID     This is your Care EveryWhere ID. This could be used by other organizations to access your [facility] medical records  [id number]        Your Vitals Were     Pulse Temperature Respirations Height Pulse Oximetry BMI (Body Mass Index)    78 97.9  F (36.6  C) (Oral) 18 1.613 m (5' 3.5\") 95% 34.61 kg/m2       Blood Pressure from Last 3 Encounters:   05/15/17 130/84   02/27/17 138/85   10/31/16 143/82    Weight from Last 3 Encounters:   05/15/17 90 kg (198 lb 8 oz)   02/27/17 84.4 kg (186 lb)   10/31/16 78.9 kg (174 lb)               Primary Care Provider Office Phone # Fax #    Sandra Diane Mock MD, [phone]       [address] [address]        Thank you!     Thank you for choosing [facility]  for your care. Our goal is always to provide you with excellent care. Hearing back from our patients is one way we can continue to improve our services. Please take a few minutes to complete the written survey that you may receive in the mail after your visit with us. Thank you!             Your Updated Medication List - Protect others around you: Learn how to safely use, store and throw away your medicines at www.disposemymeds.org.          This list is accurate as " of: 5/15/17  9:20 AM.  Always use your most recent med list.                   Brand Name Dispense Instructions for use    ARANESP (ALBUMIN FREE) 60 MCG/0.3ML injection   Generic drug:  darbepoetin maycol      Inject 60 mcg Subcutaneous once a week       azaTHIOprine 50 MG tablet    IMURAN     Take 50 mg by mouth daily       Calcium Carb-Cholecalciferol 500-400 MG-UNIT Tabs    calcium 500 + D    30 tablet    Take 1 tablet by mouth daily       escitalopram 10 MG tablet    LEXAPRO    60 tablet    Take 2 tablets (20 mg) by mouth daily       LASIX 20 MG tablet   Generic drug:  furosemide     180 tablet    Take 80 mg by mouth daily Take in the morning after recording your morning weight       levETIRAcetam 250 MG tablet    KEPPRA    60 tablet    Take 1 tablet (250 mg) by mouth every 12 hours       MELATONIN PO      Take 3 mg by mouth nightly as needed       metoprolol 25 MG tablet    LOPRESSOR     Take 25 mg by mouth 2 times daily       mirtazapine 15 MG tablet    REMERON    30 tablet    TAKE 1 TABLET BY MOUTH DAILY AT BEDTIME       multivitamin, therapeutic Tabs tablet     30 tablet    Take 1 tablet by mouth daily       NITROGLYCERIN SL          ondansetron 4 MG tablet    ZOFRAN    18 tablet    Take 1 tablet (4 mg) by mouth every 6 hours as needed for nausea or vomiting       * order for DME      Oxygen-3 liters per minute.       * order for DME     1 Units    Equipment being ordered: BIPAP. Goal settings of 10/5 with 3lpm NC O2 bleed through.       order for DME     1 Device    Equipment being ordered from [facility]: Shiley cuffless trach size 6 with inner cannula *Please send extra inner cannula       oxyCODONE-acetaminophen 5-325 MG per tablet    PERCOCET    120 tablet    Take 1-2 tablets by mouth every 6 hours as needed for moderate to severe pain (maximum 8/day)       pantoprazole 40 MG EC tablet    PROTONIX    60 tablet    Take 1 tablet (40 mg) by mouth 2 times daily       * predniSONE 2.5 MG tablet    DELTASONE     30 tablet    Take 1 tablet (2.5 mg) by mouth every evening       * predniSONE 5 MG tablet    DELTASONE    30 tablet    Take 1 tablet (5 mg) by mouth every morning       * QUEtiapine 100 MG tablet    SEROquel    30 tablet    Take 1 tablet (100 mg) by mouth At Bedtime       * QUEtiapine 50 MG tablet    SEROquel    60 tablet    TAKE 1 TABLET BY MOUTH TWIC E DAILY       ROBITUSSIN DM PO      Take 10 mLs by mouth every 6 hours       senna-docusate 8.6-50 MG per tablet    SENOKOT-S;PERICOLACE    120 tablet    Take 1-2 tablets by mouth 2 times daily as needed for constipation       sirolimus Tabs tablet     90 tablet    Take 3 tablets (3 mg) by mouth daily       sodium chloride (PF) 0.9% PF flush     300 mL    May use 3cc in trach cares as needed PRN       sulfamethoxazole-trimethoprim 400-80 MG per tablet    BACTRIM    12 tablet    Take 1 tablet by mouth three times a week (Mon, Wed, Fri)       * tacrolimus 1 MG capsule    PROGRAF - GENERIC EQUIVALENT    300 capsule    Take 5.5 mg in the AM and 5 mg in the PM       * tacrolimus 0.5 MG capsule    PROGRAF - GENERIC EQUIVALENT    30 capsule    Take 5.5 mg in the AM and 5 mg in the PM       * Notice:  This list has 8 medication(s) that are the same as other medications prescribed for you. Read the directions carefully, and ask your doctor or other care provider to review them with you.

## 2017-05-15 NOTE — PATIENT INSTRUCTIONS
Patient Instructions  1. Set up return visit to nephrology  2. Stay well hydrated with 65 oz of fluids daily  3. Exercise - goal of -30 minutes of non stop walking daiy  4. Echo of heart today  5. If CMV is normal- will not need to see ID locally in [place]  6. Set up appt to see nutritionist to set up therapy plan of weight loss  7. Repeat CBC later this week for low platelets  8. Redraw CBC with Diff and peripheral smear  See Cardiologist back home following echo    Next transplant clinic appointment:  Already have appt for June 5th or 12th with CXR, labs and PFTs  Next lab draw: today      AVS printed at time of check out    ~~~~~~~~~~~~~~~~~~~~~~~~~    Thoracic Transplant Office phone [phone], fax [phone]  Office Hours 8:30 - 5:00     For after-hours urgent issues, please dial [phone], and ask to speak with the Thoracic Transplant Coordinator  On-Call, pager [phone].  --------------------  To expedite your medication refill(s), please contact your pharmacy and have them fax a refill request to: [phone].   *Please allow 3 business days for routine medication refills.  *Please allow 5 business days for controlled substance medication refills.    **For Diabetic medications and supplies refill(s), please contact your pharmacy and have them  Contact your Endocrine team.  --------------------  For scheduling appointments call [facility] :  [phone]. For lab appointments call [phone] or [facility].  --------------------  Please Note: If you are active on [employer], all future test results will be sent by [employer] message only, and will no longer be called to patient. You may also receive communication directly from your physician.

## 2017-05-16 NOTE — TELEPHONE ENCOUNTER
Tacrolimus level 7.8 on 5/16  Goal 5-7  Current dose 5.5mg/5mg  Dose changed to 5 mg BID    Rapa level 3.4, pt states its a 24 hr level  Currently dose 3 mg daily  Dose changed to 4 mg daily    Repeat levels in 1 week    Echo reviewed, unremarkable however were unable to comment on diastolic function. Pt's PCP will set her up with cardiologist.   CMV negative, pt may cancel her appt with local ID  Pt PCP will set her up with dietitian  and a weight lost program.

## 2017-06-09 NOTE — PROGRESS NOTES
[facility]  Pulmonary Medicine  May 15, 2017           Today's visit note:     PATIENT PROFILE:  Jackie Romero is a 66-year-old female who is status post bilateral sequential lung transplant in 08/2015, which was complicated by a prolonged hospitalization with a final discharge from the hospital in 04/2016.  Her post-transplant course was complicated by PEA arrest from which she completely recovered, CMV reactivation, recurrent pneumonias, recurrent pleural effusions, persistent hypoxemic respiratory failure requiring tracheostomy, CKD and complications of diastolic heart failure.  She is currently living at home in [place], [place], with her  and follows up with a nephrologist and an ENT physician in [place], [place].  She was last seen in our clinic 3 months ago.      INTERVAL HISTORY:  Since her last visit she was in a local hospital in [place] for 1 week where she was admitted with chest pain in setting of hypertension and had some worsening hypoxia. She underwent evaluation of coronary artery disease which was negative. Her symptoms improved with control of blood pressure. Since getting out of the hospital she has noted that she is continuing to require some supplemental O2 through her nasal canula. She also reports some cough which is productive of clear mucous, denies any resting dyspnea but reports dyspnea on mild to moderate exertion. She also reports some headache. She denies any recent fever, chills, night sweats, upper airway congestion, palpitations or dizziness, abdominal discomfort. She has pedal edema. Rest of the review of systems is negative.      PULMONARY FUNCTION TESTS:  Her FEV1 is 0.84 liters, which is 36% predicted.  FVC is 0.97 liters, which is 33% predicted.  Her FEV1/FVC ratio is 87%.  These tests show a severe mixed obstructive and restrictive ventilatory defect.  There is a decline compared to last tests; however, the testing technique is  "limited due to the presence of the tracheostomy and ATS standard for 3 acceptable maneuvers was not met.      IMAGING:  CXR with bilateral hilar opacities, stable compared to last test. CT chest with bilateral opacities, likely atelectasis. No infiltrates or lung mass.     PHYSICAL EXAMINATION:    /84  Pulse 78  Temp 97.9  F (36.6  C) (Oral)  Resp 18  Ht 1.613 m (5' 3.5\")  Wt 90 kg (198 lb 8 oz)  SpO2 95%  BMI 34.61 kg/m2  GENERAL:  Awake, alert, in no apparent distress, sitting comfortably, talking without any discomfort in full sentences.   EYES:  Anicteric. No conjunctival icterus.   HEENT:  Tracheostomy in place.  Moist oral mucosa with a large tongue and prominent peripheral ridging.  Mallampati class II-III upper airway.   PULMONARY:  Normal intensity breath sounds bilaterally with no added sounds.   CARDIOVASCULAR:  Normal S1, S2 with no murmur, rubs or gallops. Regular rate and rhythm.   ABDOMEN:  Soft, nontender, nondistended, appears obese.   MUSCULOSKELETAL:  No lower extremity edema.  There are no upper extremity tremors.   NEUROLOGIC:  Grossly intact.   PSYCHIATRIC:  Normal affect.      ASSESSMENT AND PLAN:  The patient is a 66-year-old female who is about 18 months status post bilateral sequential lung transplant for chronic obstructive pulmonary disease with a very complicated and prolonged post-transplant course as described above.     1.  Status post bilateral sequential lung transplant for chronic obstructive pulmonary disease. The patient currently has tracheostomy and was slowly progressing towards decannulation until recently when she had hospitalization in [place].  Although it does not appear that she has graft failure, she does have significant decrease in her PFTs which are likely reflective of poor effort and overall poor health.  The most likely cause of her recent presentation was hypertensive urgency leading to decompensation of diastolic heart failure.  This was also " precipitated by chronic kidney disease.  We are not making any immunosuppression changes today.  He will remain on tacrolimus 4-6 ng/mL and sirolimus 4-6 ng/mL.  She also remains on MMF and prednisone.  Upon review of her chest CT scan bilateral scattered atelectasis were noted with no significant signs of infections.  She will continue to work with local ENT and speech therapy to progress towards trach decannulation.      2.  Chronic kidney disease.  The patient's creatinine today is 2.0 which is improved compared to recent levels.  The most likely etiology of her chronic kidney disease is related to calcineurin inhibitor toxicity.  She will continue to follow with her local nephrologist for this.     3.  Obesity.  The patient is noted to have gained significant amount of weight approximately 25 pounds in last 6 months.  She does have some pedal edema due to chronic kidney disease however this has not significantly changed from the past.  It is very likely that her weight gain is contributing to poor control of diastolic dysfunction and to worsening of her lung function.  We will make a referral to local nutritionist to work on a weight loss plan.  She will also continue to discuss further strategies with her primary care provider.      4.  Diastolic heart failure and hypertension. She had a recent episode of hypertensive urgency which resulted in decompensation of diastolic heart failure.  She is currently on metoprolol which will be continued.  Her blood pressure is currently controlled.  We will make a referral to a cardiologist for further evaluation and management.  We will get a transthoracic echo today.      5.  Thrombocytopenia.  This is noted today and the labs.  She does not have a history of low platelets.  The etiology of this is unclear and is likely related to lab error we will repeat CBC today.      6.  Seizure disorder.  She remains levetiracetam 500 mg twice a day and denies any recent episodes of  seizures.      7.  Anemia.  This is likely chronic due to kidney disease.  She continues to get erythropoietin injections per her nephrologist.  We will continue to monitor.      8.  GERD.  Symptoms are well controlled on current PPI therapy.  This will be continued.      The patient will be seen back in clinic in 1 month.           Data:     I reviewed all resulted lab tests and imaging studies.    Results for orders placed or performed in visit on 05/15/17   CT Chest w/o contrast    Narrative    EXAMINATION: Chest CT  5/15/2017     CLINICAL HISTORY: Follow-up lung mass seen during outside facility  bronchoscopy, lung transplant.    COMPARISON: CT 6/13/2016.    TECHNIQUE: CT imaging obtained through the chest without intravenous  contrast. Coronal, sagittal and axial MIP reformatted images obtained.    FINDINGS:  Postoperative changes of bilateral lung transplantation. A few tiny  discrete pulmonary nodules are identified. For example, there is a 2  mm nodule within the right upper lobe (series 4, image 70). Again  noted is scattered bilateral atelectasis, somewhat improved since the  prior CT dated 6/13/2016. Chronic subtle bilateral pleural effusions,  left greater than right. No pneumothorax. Tracheostomy tube in place.  Scattered areas of mucus plugging, particularly within the left lower  lobe. No endobronchial masses or lesions identified.    Cardiomegaly. No pericardial effusion. Normal branching pattern of the  aorta. Unremarkable thyroid. Noncontrast evaluation of the central  pulmonary vasculature is unremarkable. Moderate atherosclerotic  calcifications of the coronary vasculature. No significant mediastinal  or axillary lymphadenopathy.    Limited evaluation of the superior abdomen is unremarkable. Healed  bilateral rib fractures. Vertebroplasty changes within the lower  thoracic spine. Multiple compression deformities identified.      Impression    IMPRESSION:   1. Postoperative changes of bilateral  lung transplantation. No  bronchial or lung mass identified.  2. Scattered bilateral atelectasis, slightly improved since 6/13/2016.  3. Subtle bilateral chronic pleural effusions, left greater than  right.  4. Stable thoracic spine compression deformities.    I have personally reviewed the examination and initial interpretation  and I agree with the findings.    BRODIE HPAN MD     *Note: Due to a large number of results and/or encounters for the requested time period, some results have not been displayed. A complete set of results can be found in Results Review.                Past Medical and Surgical History:     Past Medical History:   Diagnosis Date     Acute on chronic diastolic congestive heart failure (H) 2/8/2016     Arthritis      CAD (coronary artery disease) 4/18/10    Mild, per cath     Chronic back pain      CKD (chronic kidney disease) stage 4, GFR 15-29 ml/min (H)     Dialysis March 2016     Colitis 2007    History of colitis for which she underwent a colonoscopy approximately 3 years ago and this was essentially normal      COPD (chronic obstructive pulmonary disease) (H)     Very severe lung disease.     Depressive disorder      Hemorrhoids      History of blood transfusion      Hyperlipidemia      Hypertension      Lung transplant status, bilateral (H)      Osteoporosis     Osteoporosis with multiple factors and kyphoplasties.  She had a kyphoplasty on 8/27/09 for L3, T10, and T11 on January 20 to 23 and T12 to L1 in October 2008.  At least 1 or 2 of these episodes seem to have been associated with seizures.      PEA (Pulseless electrical activity) (H) 10/19/2015     PMR (polymyalgia rheumatica) (H)      Pneumonia     Complicating lung transplant     Respiratory failure with hypoxia (H)      Seizure (H)     Has had grand mal seizures since age 16 and not well controlled on dilantin.  She has had an EEG which was normal in May 2009.  Started on Keppra in May 2010.     Vitamin D deficiency       Past Surgical History:   Procedure Laterality Date     APPENDECTOMY       BRONCHOSCOPY FLEXIBLE N/A 11/17/2015    Procedure: BRONCHOSCOPY FLEXIBLE;  Surgeon: Lex Bustillo MD;  Location:  GI     BRONCHOSCOPY FLEXIBLE N/A 12/2/2015    Procedure: BRONCHOSCOPY FLEXIBLE;  Surgeon: Zeb Tiwari MD;  Location: [facility] GI     BRONCHOSCOPY FLEXIBLE N/A 12/11/2015    Procedure: BRONCHOSCOPY FLEXIBLE;  Surgeon: Vivek Feliz MD;  Location:  GI     BRONCHOSCOPY FLEXIBLE N/A 5/27/2016    Procedure: BRONCHOSCOPY FLEXIBLE;  Surgeon: Dane Alvarado MD;  Location: [facility] OR     CARPAL TUNNEL RELEASE RT/LT      Carpal tunnel surgery for pain in her right arm in August 2009.  She is still having some numbness in the tips of her thumb, index finger, and possibly the middle finger.      CATARACT IOL, RT/LT       DESTR, MACULAR DRUSEN, PHOTOCOAGULATION       HEART CATH [facility]  3/11/2009    Vasospasm, no significan disease; again at [place] 4/18/10     HYSTERECTOMY TOTAL ABDOMINAL, BILATERAL SALPINGO-OOPHORECTOMY, COMBINED  1976    For Cyst on ovaries and endometriosis.     INCISION AND CLOSURE OF STERNUM N/A 8/31/2015    Procedure: INCISION AND CLOSURE OF STERNUM;  Surgeon: Eusebio Cotton MD;  Location: [facility] OR     KYPHOPLASTY      T12, L1     KYPHOPLASTY  1/21/2009    T10-11     LASER KTP LARYNGOSCOPY N/A 5/27/2016    Procedure: LASER KTP LARYNGOSCOPY;  Surgeon: Dane Alvarado MD;  Location: [facility] OR     PICC INSERTION Right 9/8/2015    5fr DL Power PICC, 41cm (1cm external) in the R basilic vein     TRACHEOSTOMY PERCUTANEOUS N/A 9/10/2015    Procedure: TRACHEOSTOMY PERCUTANEOUS;  Surgeon: Zeb Tiwari MD;  Location: [facility] OR     TRANSPLANT LUNG RECIPIENT SINGLE X2 Bilateral 8/27/2015    Procedure: TRANSPLANT LUNG RECIPIENT SINGLE X2;  Surgeon: Eusebio Cotton MD;  Location: [facility] OR           Family History:     Family History   Problem Relation Age of Onset     Emphysema Father      COD,  smoker     LUNG DISEASE Father      CEREBROVASCULAR DISEASE Mother      DIABETES Mother      OSTEOPOROSIS Mother      Breast Cancer Mother      Suicide Son      20 yo     Substance Abuse Brother      COD     Substance Abuse Brother      Hypertension Sister             Social History:     Social History     Social History     Marital status:      Spouse name: N/A     Number of children: N/A     Years of education: N/A     Occupational History     Not on file.     Social History Main Topics     Smoking status: Former Smoker     Packs/day: 1.50     Years: 40.00     Types: Cigarettes     Start date: 1968     Quit date: 10/12/2008     Smokeless tobacco: Former User      Comment: Quit in . started in . Smoked 1 to 2 packs per day.     Alcohol use No     Drug use: No     Sexual activity: Not Currently     Partners: Male     Other Topics Concern     Not on file     Social History Narrative    No longer drinks alcohol. 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  Did not have any significant exposures during any of her jobs except for the fact that she had a coal furnace at home when she was growing up.        1 daughter: healthy    7 siblings:  2 :  1 bleeding ulcer. 1  of drug over dose.             Medications:     Current Outpatient Prescriptions   Medication     QUEtiapine (SEROQUEL) 50 MG tablet     pantoprazole (PROTONIX) 40 MG EC tablet     mirtazapine (REMERON) 15 MG tablet     sodium chloride, PF, 0.9% PF flush     darbepoetin maycol (ARANESP, ALBUMIN FREE,) 60 MCG/0.3ML injection     metoprolol (LOPRESSOR) 25 MG tablet     Dextromethorphan-Guaifenesin (ROBITUSSIN DM PO)     azaTHIOprine (IMURAN) 50 MG tablet     levETIRAcetam (KEPPRA) 250 MG tablet     oxyCODONE-acetaminophen (PERCOCET) 5-325 MG per tablet     sulfamethoxazole-trimethoprim (BACTRIM) 400-80 MG per tablet      furosemide (LASIX) 20 MG tablet     Calcium Carb-Cholecalciferol (CALCIUM 500 + D) 500-400 MG-UNIT TABS     predniSONE (DELTASONE) 2.5 MG tablet     escitalopram (LEXAPRO) 10 MG tablet     QUEtiapine (SEROQUEL) 100 MG tablet     predniSONE (DELTASONE) 5 MG tablet     NITROGLYCERIN SL     order for DME     senna-docusate (SENOKOT-S;PERICOLACE) 8.6-50 MG per tablet     MELATONIN PO     ondansetron (ZOFRAN) 4 MG tablet     multivitamin, therapeutic (THERA-VIT) TABS     ORDER FOR DME     ORDER FOR DME     tacrolimus (PROGRAF - GENERIC EQUIVALENT) 1 MG capsule     RAPAMUNE 1 MG PO TABLET     No current facility-administered medications for this visit.      Vivek Feliz MD   of Medicine  Pulmonary, Critical Care and Sleep Medicine  [facility]  Pager: [phone]

## 2017-06-22 ENCOUNTER — POST MORTEM DOCUMENTATION (OUTPATIENT)
Dept: TRANSPLANT | Facility: CLINIC | Age: 67
End: 2017-06-22

## 2017-06-22 NOTE — PROGRESS NOTES
Received notification of patient's death from spouse, contact information is [phone].  Place of death was reported as [facility] in [facility].  Graft status at the time of death was reported as Functioning.  Additional information: patient was on dialysis, ventilator. BAL growing pseudomonas and Ecoli. Patient and family decided to discontinue all supporting treatments and transition to comfort cares. Dialysis was stopped on  and patient   surround by family.   TIS verification is: Pending  The Transplant Office has been notified that patient is .  The Post Mortem Encounter has been completed.  Notifications have been sent to the Care team and Social Work.   Instructions have been sent to cancel pending appointments, discontinue pending orders, eliminate paper chart and send a sympathy card to the family.    ISAAC HAYES    Received notification of patient's death from Maria [facility] Simental.  Place of death was reported as [facility].  Graft status at the time of death was reported as Functioning.  Additional information:   COD discussed and determined with Al Goodwin  TIS verification is: Complete
